# Patient Record
Sex: MALE | Race: WHITE | Employment: OTHER | URBAN - METROPOLITAN AREA
[De-identification: names, ages, dates, MRNs, and addresses within clinical notes are randomized per-mention and may not be internally consistent; named-entity substitution may affect disease eponyms.]

---

## 2022-01-15 ENCOUNTER — APPOINTMENT (OUTPATIENT)
Dept: GENERAL RADIOLOGY | Age: 67
End: 2022-01-15
Payer: MEDICARE

## 2022-01-15 ENCOUNTER — HOSPITAL ENCOUNTER (EMERGENCY)
Age: 67
Discharge: ANOTHER HOSPITAL | End: 2022-01-15
Attending: EMERGENCY MEDICINE
Payer: MEDICARE

## 2022-01-15 ENCOUNTER — APPOINTMENT (OUTPATIENT)
Dept: CT IMAGING | Age: 67
End: 2022-01-15
Payer: MEDICARE

## 2022-01-15 ENCOUNTER — HOSPITAL ENCOUNTER (INPATIENT)
Age: 67
LOS: 4 days | Discharge: HOME | DRG: 177 | End: 2022-01-19
Attending: FAMILY MEDICINE | Admitting: INTERNAL MEDICINE
Payer: MEDICARE

## 2022-01-15 VITALS
WEIGHT: 210 LBS | BODY MASS INDEX: 26.11 KG/M2 | HEIGHT: 75 IN | HEART RATE: 79 BPM | DIASTOLIC BLOOD PRESSURE: 90 MMHG | RESPIRATION RATE: 18 BRPM | OXYGEN SATURATION: 92 % | SYSTOLIC BLOOD PRESSURE: 138 MMHG | TEMPERATURE: 97.3 F

## 2022-01-15 DIAGNOSIS — I26.99 OTHER ACUTE PULMONARY EMBOLISM WITHOUT ACUTE COR PULMONALE (HCC): ICD-10-CM

## 2022-01-15 DIAGNOSIS — U07.1 COVID-19: Primary | ICD-10-CM

## 2022-01-15 PROBLEM — E78.5 HLD (HYPERLIPIDEMIA): Status: ACTIVE | Noted: 2022-01-15

## 2022-01-15 LAB
ALBUMIN SERPL-MCNC: 3.8 G/DL (ref 3.5–4.6)
ALP BLD-CCNC: 90 U/L (ref 35–104)
ALT SERPL-CCNC: 20 U/L (ref 0–41)
AMORPHOUS: NORMAL
ANION GAP SERPL CALCULATED.3IONS-SCNC: 15 MEQ/L (ref 9–15)
ANTI-XA UNFRAC HEPARIN: <0.1 IU/ML
APTT: 29.5 SEC (ref 24.4–36.8)
APTT: 43.4 SEC (ref 24.4–36.8)
AST SERPL-CCNC: 52 U/L (ref 0–40)
BASOPHILS ABSOLUTE: 0 K/UL (ref 0–0.1)
BASOPHILS RELATIVE PERCENT: 0 % (ref 0.2–1.2)
BILIRUB SERPL-MCNC: 1.3 MG/DL (ref 0.2–0.7)
BILIRUBIN URINE: ABNORMAL
BLOOD, URINE: ABNORMAL
BUN BLDV-MCNC: 22 MG/DL (ref 8–23)
C-REACTIVE PROTEIN: 60.3 MG/L (ref 0–5)
CALCIUM SERPL-MCNC: 8.8 MG/DL (ref 8.5–9.9)
CHLORIDE BLD-SCNC: 95 MEQ/L (ref 95–107)
CLARITY: CLEAR
CO2: 21 MEQ/L (ref 20–31)
COLOR: ABNORMAL
CREAT SERPL-MCNC: 0.97 MG/DL (ref 0.7–1.2)
D DIMER: 1.03 MG/L FEU (ref 0–0.5)
D DIMER: 1.84 MG/L FEU (ref 0–0.5)
EKG ATRIAL RATE: 89 BPM
EKG P AXIS: 46 DEGREES
EKG P-R INTERVAL: 130 MS
EKG Q-T INTERVAL: 366 MS
EKG QRS DURATION: 86 MS
EKG QTC CALCULATION (BAZETT): 445 MS
EKG R AXIS: 40 DEGREES
EKG T AXIS: 39 DEGREES
EKG VENTRICULAR RATE: 89 BPM
EOSINOPHILS ABSOLUTE: 0 K/UL (ref 0–0.5)
EOSINOPHILS RELATIVE PERCENT: 0.3 % (ref 0.8–7)
EPITHELIAL CELLS, UA: NORMAL /HPF
FINE CASTS, UA: NORMAL /LPF (ref 0–5)
GFR AFRICAN AMERICAN: >60
GFR NON-AFRICAN AMERICAN: >60
GLOBULIN: 3 G/DL (ref 2.3–3.5)
GLUCOSE BLD-MCNC: 122 MG/DL (ref 70–99)
GLUCOSE URINE: NEGATIVE MG/DL
HCT VFR BLD CALC: 39.6 % (ref 42–52)
HCT VFR BLD CALC: 42.2 % (ref 42–52)
HEMOGLOBIN: 14 G/DL (ref 14–18)
HEMOGLOBIN: 15.2 G/DL (ref 13.7–17.5)
HYALINE CASTS: NORMAL /LPF (ref 0–5)
IMMATURE GRANULOCYTES #: 0 K/UL
IMMATURE GRANULOCYTES %: 0.9 %
INR BLD: 1.1
INR BLD: 1.1
KETONES, URINE: 15 MG/DL
LACTATE DEHYDROGENASE: 722 U/L (ref 135–225)
LEUKOCYTE ESTERASE, URINE: NEGATIVE
LYMPHOCYTES ABSOLUTE: 0.5 K/UL (ref 1.3–3.6)
LYMPHOCYTES RELATIVE PERCENT: 13.6 %
MCH RBC QN AUTO: 29.2 PG (ref 27–31.3)
MCH RBC QN AUTO: 29.3 PG (ref 25.7–32.2)
MCHC RBC AUTO-ENTMCNC: 35.4 % (ref 33–37)
MCHC RBC AUTO-ENTMCNC: 36 % (ref 32.3–36.5)
MCV RBC AUTO: 81.5 FL (ref 79–92.2)
MCV RBC AUTO: 82.6 FL (ref 80–100)
MONOCYTES ABSOLUTE: 0.1 K/UL (ref 0.3–0.8)
MONOCYTES RELATIVE PERCENT: 4.2 % (ref 5.3–12.2)
MUCUS: PRESENT /LPF
NEUTROPHILS ABSOLUTE: 2.7 K/UL (ref 1.8–5.4)
NEUTROPHILS RELATIVE PERCENT: 81 % (ref 34–67.9)
NITRITE, URINE: NEGATIVE
PDW BLD-RTO: 13.2 % (ref 11.6–14.4)
PDW BLD-RTO: 13.9 % (ref 11.5–14.5)
PH UA: 5.5 (ref 5–9)
PLATELET # BLD: 113 K/UL (ref 163–337)
PLATELET # BLD: 120 K/UL (ref 130–400)
PLATELET SLIDE REVIEW: ABNORMAL
POTASSIUM REFLEX MAGNESIUM: 3.7 MEQ/L (ref 3.4–4.9)
PRO-BNP: 22 PG/ML
PROCALCITONIN: 0.08 NG/ML (ref 0–0.15)
PROTEIN UA: 100 MG/DL
PROTHROMBIN TIME: 13.7 SEC (ref 12.3–14.9)
PROTHROMBIN TIME: 14 SEC (ref 12.3–14.9)
RBC # BLD: 4.79 M/UL (ref 4.7–6.1)
RBC # BLD: 5.18 M/UL (ref 4.63–6.08)
RBC UA: NORMAL /HPF (ref 0–2)
SARS-COV-2, NAAT: DETECTED
SLIDE REVIEW: ABNORMAL
SODIUM BLD-SCNC: 131 MEQ/L (ref 135–144)
SPECIFIC GRAVITY UA: 1.01 (ref 1–1.03)
TOTAL PROTEIN: 6.8 G/DL (ref 6.3–8)
TROPONIN: <0.01 NG/ML (ref 0–0.01)
UROBILINOGEN, URINE: 4 E.U./DL
WBC # BLD: 2.1 K/UL (ref 4.8–10.8)
WBC # BLD: 3.3 K/UL (ref 4.2–9)
WBC UA: NORMAL /HPF (ref 0–5)

## 2022-01-15 PROCEDURE — 85025 COMPLETE CBC W/AUTO DIFF WBC: CPT

## 2022-01-15 PROCEDURE — 2580000003 HC RX 258: Performed by: EMERGENCY MEDICINE

## 2022-01-15 PROCEDURE — 84484 ASSAY OF TROPONIN QUANT: CPT

## 2022-01-15 PROCEDURE — 6360000002 HC RX W HCPCS: Performed by: EMERGENCY MEDICINE

## 2022-01-15 PROCEDURE — 83880 ASSAY OF NATRIURETIC PEPTIDE: CPT

## 2022-01-15 PROCEDURE — 81001 URINALYSIS AUTO W/SCOPE: CPT

## 2022-01-15 PROCEDURE — 96366 THER/PROPH/DIAG IV INF ADDON: CPT

## 2022-01-15 PROCEDURE — 71045 X-RAY EXAM CHEST 1 VIEW: CPT

## 2022-01-15 PROCEDURE — 87635 SARS-COV-2 COVID-19 AMP PRB: CPT

## 2022-01-15 PROCEDURE — 87040 BLOOD CULTURE FOR BACTERIA: CPT

## 2022-01-15 PROCEDURE — 6360000002 HC RX W HCPCS: Performed by: NURSE PRACTITIONER

## 2022-01-15 PROCEDURE — 36415 COLL VENOUS BLD VENIPUNCTURE: CPT

## 2022-01-15 PROCEDURE — 1210000000 HC MED SURG R&B

## 2022-01-15 PROCEDURE — 82728 ASSAY OF FERRITIN: CPT

## 2022-01-15 PROCEDURE — 80053 COMPREHEN METABOLIC PANEL: CPT

## 2022-01-15 PROCEDURE — 83615 LACTATE (LD) (LDH) ENZYME: CPT

## 2022-01-15 PROCEDURE — 96375 TX/PRO/DX INJ NEW DRUG ADDON: CPT

## 2022-01-15 PROCEDURE — 93005 ELECTROCARDIOGRAM TRACING: CPT

## 2022-01-15 PROCEDURE — 99285 EMERGENCY DEPT VISIT HI MDM: CPT

## 2022-01-15 PROCEDURE — 96376 TX/PRO/DX INJ SAME DRUG ADON: CPT

## 2022-01-15 PROCEDURE — 85610 PROTHROMBIN TIME: CPT

## 2022-01-15 PROCEDURE — 71275 CT ANGIOGRAPHY CHEST: CPT

## 2022-01-15 PROCEDURE — 96374 THER/PROPH/DIAG INJ IV PUSH: CPT

## 2022-01-15 PROCEDURE — 84145 PROCALCITONIN (PCT): CPT

## 2022-01-15 PROCEDURE — 96365 THER/PROPH/DIAG IV INF INIT: CPT

## 2022-01-15 PROCEDURE — 85730 THROMBOPLASTIN TIME PARTIAL: CPT

## 2022-01-15 PROCEDURE — 85379 FIBRIN DEGRADATION QUANT: CPT

## 2022-01-15 PROCEDURE — 85520 HEPARIN ASSAY: CPT

## 2022-01-15 PROCEDURE — 6370000000 HC RX 637 (ALT 250 FOR IP): Performed by: NURSE PRACTITIONER

## 2022-01-15 PROCEDURE — 86140 C-REACTIVE PROTEIN: CPT

## 2022-01-15 PROCEDURE — 6360000004 HC RX CONTRAST MEDICATION: Performed by: EMERGENCY MEDICINE

## 2022-01-15 PROCEDURE — 85027 COMPLETE CBC AUTOMATED: CPT

## 2022-01-15 RX ORDER — ONDANSETRON 2 MG/ML
4 INJECTION INTRAMUSCULAR; INTRAVENOUS EVERY 6 HOURS PRN
Status: DISCONTINUED | OUTPATIENT
Start: 2022-01-15 | End: 2022-01-19 | Stop reason: HOSPADM

## 2022-01-15 RX ORDER — HEPARIN SODIUM 1000 [USP'U]/ML
80 INJECTION, SOLUTION INTRAVENOUS; SUBCUTANEOUS PRN
Status: DISCONTINUED | OUTPATIENT
Start: 2022-01-15 | End: 2022-01-15 | Stop reason: HOSPADM

## 2022-01-15 RX ORDER — VITAMIN B COMPLEX
2000 TABLET ORAL DAILY
Status: DISCONTINUED | OUTPATIENT
Start: 2022-01-22 | End: 2022-01-19 | Stop reason: HOSPADM

## 2022-01-15 RX ORDER — POLYETHYLENE GLYCOL 3350 17 G/17G
17 POWDER, FOR SOLUTION ORAL DAILY PRN
Status: DISCONTINUED | OUTPATIENT
Start: 2022-01-15 | End: 2022-01-19 | Stop reason: HOSPADM

## 2022-01-15 RX ORDER — SODIUM CHLORIDE 0.9 % (FLUSH) 0.9 %
5-40 SYRINGE (ML) INJECTION PRN
Status: DISCONTINUED | OUTPATIENT
Start: 2022-01-15 | End: 2022-01-19 | Stop reason: HOSPADM

## 2022-01-15 RX ORDER — ACETAMINOPHEN 325 MG/1
650 TABLET ORAL EVERY 6 HOURS PRN
Status: DISCONTINUED | OUTPATIENT
Start: 2022-01-15 | End: 2022-01-15 | Stop reason: SDUPTHER

## 2022-01-15 RX ORDER — ACETAMINOPHEN 325 MG/1
650 TABLET ORAL EVERY 6 HOURS PRN
Status: DISCONTINUED | OUTPATIENT
Start: 2022-01-15 | End: 2022-01-19 | Stop reason: HOSPADM

## 2022-01-15 RX ORDER — ACETAMINOPHEN 650 MG/1
650 SUPPOSITORY RECTAL EVERY 6 HOURS PRN
Status: DISCONTINUED | OUTPATIENT
Start: 2022-01-15 | End: 2022-01-15 | Stop reason: SDUPTHER

## 2022-01-15 RX ORDER — HEPARIN SODIUM 1000 [USP'U]/ML
40 INJECTION, SOLUTION INTRAVENOUS; SUBCUTANEOUS PRN
Status: DISCONTINUED | OUTPATIENT
Start: 2022-01-15 | End: 2022-01-19 | Stop reason: HOSPADM

## 2022-01-15 RX ORDER — SODIUM CHLORIDE 9 MG/ML
25 INJECTION, SOLUTION INTRAVENOUS PRN
Status: DISCONTINUED | OUTPATIENT
Start: 2022-01-15 | End: 2022-01-19 | Stop reason: HOSPADM

## 2022-01-15 RX ORDER — ACETAMINOPHEN 650 MG/1
650 SUPPOSITORY RECTAL EVERY 6 HOURS PRN
Status: DISCONTINUED | OUTPATIENT
Start: 2022-01-15 | End: 2022-01-19 | Stop reason: HOSPADM

## 2022-01-15 RX ORDER — HEPARIN SODIUM 10000 [USP'U]/100ML
5-30 INJECTION, SOLUTION INTRAVENOUS CONTINUOUS
Status: DISCONTINUED | OUTPATIENT
Start: 2022-01-15 | End: 2022-01-15 | Stop reason: HOSPADM

## 2022-01-15 RX ORDER — HEPARIN SODIUM 1000 [USP'U]/ML
80 INJECTION, SOLUTION INTRAVENOUS; SUBCUTANEOUS PRN
Status: DISCONTINUED | OUTPATIENT
Start: 2022-01-15 | End: 2022-01-19 | Stop reason: HOSPADM

## 2022-01-15 RX ORDER — 0.9 % SODIUM CHLORIDE 0.9 %
1000 INTRAVENOUS SOLUTION INTRAVENOUS ONCE
Status: COMPLETED | OUTPATIENT
Start: 2022-01-15 | End: 2022-01-15

## 2022-01-15 RX ORDER — HEPARIN SODIUM 1000 [USP'U]/ML
80 INJECTION, SOLUTION INTRAVENOUS; SUBCUTANEOUS ONCE
Status: COMPLETED | OUTPATIENT
Start: 2022-01-15 | End: 2022-01-15

## 2022-01-15 RX ORDER — ONDANSETRON 4 MG/1
4 TABLET, ORALLY DISINTEGRATING ORAL EVERY 8 HOURS PRN
Status: DISCONTINUED | OUTPATIENT
Start: 2022-01-15 | End: 2022-01-19 | Stop reason: HOSPADM

## 2022-01-15 RX ORDER — HEPARIN SODIUM 1000 [USP'U]/ML
40 INJECTION, SOLUTION INTRAVENOUS; SUBCUTANEOUS PRN
Status: DISCONTINUED | OUTPATIENT
Start: 2022-01-15 | End: 2022-01-15 | Stop reason: HOSPADM

## 2022-01-15 RX ORDER — SODIUM CHLORIDE 0.9 % (FLUSH) 0.9 %
5-40 SYRINGE (ML) INJECTION EVERY 12 HOURS SCHEDULED
Status: DISCONTINUED | OUTPATIENT
Start: 2022-01-15 | End: 2022-01-19 | Stop reason: HOSPADM

## 2022-01-15 RX ORDER — DEXAMETHASONE 6 MG/1
6 TABLET ORAL DAILY
Status: DISCONTINUED | OUTPATIENT
Start: 2022-01-16 | End: 2022-01-19 | Stop reason: HOSPADM

## 2022-01-15 RX ORDER — GUAIFENESIN/DEXTROMETHORPHAN 100-10MG/5
5 SYRUP ORAL EVERY 4 HOURS PRN
Status: DISCONTINUED | OUTPATIENT
Start: 2022-01-15 | End: 2022-01-19 | Stop reason: HOSPADM

## 2022-01-15 RX ORDER — DEXAMETHASONE SODIUM PHOSPHATE 10 MG/ML
6 INJECTION, SOLUTION INTRAMUSCULAR; INTRAVENOUS ONCE
Status: COMPLETED | OUTPATIENT
Start: 2022-01-15 | End: 2022-01-15

## 2022-01-15 RX ORDER — VITAMIN B COMPLEX
6000 TABLET ORAL DAILY
Status: DISCONTINUED | OUTPATIENT
Start: 2022-01-15 | End: 2022-01-19 | Stop reason: HOSPADM

## 2022-01-15 RX ADMIN — HEPARIN SODIUM 7620 UNITS: 1000 INJECTION INTRAVENOUS; SUBCUTANEOUS at 22:07

## 2022-01-15 RX ADMIN — DEXAMETHASONE SODIUM PHOSPHATE 6 MG: 10 INJECTION INTRAMUSCULAR; INTRAVENOUS at 11:13

## 2022-01-15 RX ADMIN — Medication 6000 UNITS: at 22:17

## 2022-01-15 RX ADMIN — HEPARIN SODIUM AND DEXTROSE 5 UNITS/KG/HR: 10000; 5 INJECTION INTRAVENOUS at 13:44

## 2022-01-15 RX ADMIN — HEPARIN SODIUM 7620 UNITS: 1000 INJECTION INTRAVENOUS; SUBCUTANEOUS at 13:45

## 2022-01-15 RX ADMIN — SODIUM CHLORIDE 1000 ML: 9 INJECTION, SOLUTION INTRAVENOUS at 11:13

## 2022-01-15 RX ADMIN — IOPAMIDOL 100 ML: 755 INJECTION, SOLUTION INTRAVENOUS at 11:25

## 2022-01-15 ASSESSMENT — ENCOUNTER SYMPTOMS
RHINORRHEA: 0
WHEEZING: 0
BACK PAIN: 0
COUGH: 1
PHOTOPHOBIA: 0
DIARRHEA: 0
NAUSEA: 0
SHORTNESS OF BREATH: 1
SORE THROAT: 0
VOMITING: 0
ABDOMINAL PAIN: 0

## 2022-01-15 NOTE — ED NOTES
[facility] called back with Dr. Paige Cutler accepted patient for admission/transfer     Nirmal Manzano RN  01/15/22 [id number]

## 2022-01-15 NOTE — ED NOTES
Called transfer center for update on bed assignment. Patient has been assigned a bed but is currently being cleaned. States it will be about 40 minutes until bed will be ready. Will call us back with bed assignment and number for report.       Jodi Young  01/15/22 615 Mark Damon  01/15/22 6402

## 2022-01-15 NOTE — ED NOTES
Called transfer center to tristan hospitalist at [facility] for Dr to  consult.       Shelly Wilcox  01/15/22 3942

## 2022-01-15 NOTE — ED NOTES
Heparin gtt continued in route via EMS by IV pump to [facility], report included informing nurse at [facility] that pt/INR be drawn prior to 2000.      Caren White RN  01/15/22 3784

## 2022-01-15 NOTE — ED NOTES
Call from transfer center with bed assignment. Patient assigned bed 468-1. Number for report is [phone].       Chase Fitzgerald  01/15/22 9316

## 2022-01-15 NOTE — ED NOTES
Report given to Richa [place] at bedside patient transported to [facility] via [address] with belongings.      Terrell Menezes RN  01/15/22 7429

## 2022-01-15 NOTE — ED TRIAGE NOTES
Patient presents to ED with c/o sob for the past 2 weeks and no improvement - also reports mild nausea

## 2022-01-15 NOTE — ED NOTES
Patient aware of POC to transfer to ED [facility]. Patient placed on 2L NC r/t pulse ox dropping to 88% on Room Air while sleeping.      Homa Padron RN  01/15/22 0903

## 2022-01-15 NOTE — ED NOTES
Report called to [facility] at [facility]. Patient to go to [phone].      Lukas Garcia RN  01/15/22 2674

## 2022-01-15 NOTE — ED PROVIDER NOTES
eMERGENCY dEPARTMENT eNCOUnter      [address] [facility]    Chief Complaint   Patient presents with    Shortness of Breath     x2 weeks, worsened over the last two days       HPI    Ebony Hoyt is a 78 y.o. male with history of kidney stones who presentsto ED from home  By private car  With complaint of shortness of breath, fever  Onset 2 days  Intensity of symptoms moderate  Patient has cough with no expectoration. Patient is not COVID vaccinated. He is not a smoker. Patient has been fever and chills. He denies chest pain but complains of shortness of breath. PAST MEDICAL HISTORY    Past Medical History:   Diagnosis Date    Kidney stone        SURGICAL HISTORY    History reviewed. No pertinent surgical history. CURRENT MEDICATIONS        ALLERGIES    No Known Allergies    FAMILY HISTORY    History reviewed. No pertinent family history. SOCIAL HISTORY    Social History     Socioeconomic History    Marital status:      Spouse name: None    Number of children: None    Years of education: None    Highest education level: None   Occupational History    None   Tobacco Use    Smoking status: Never Smoker    Smokeless tobacco: Never Used   Substance and Sexual Activity    Alcohol use: Never    Drug use: Never    Sexual activity: Not Currently   Other Topics Concern    None   Social History Narrative    None     Social Determinants of Health     Financial Resource Strain:     Difficulty of Paying Living Expenses: Not on file   Food Insecurity:     Worried About Running Out of Food in the Last Year: Not on file    Allyssa of Food in the Last Year: Not on file   Transportation Needs:     Lack of Transportation (Medical): Not on file    Lack of Transportation (Non-Medical):  Not on file   Physical Activity:     Days of Exercise per Week: Not on file    Minutes of Exercise per Session: Not on file   Stress:     Feeling of Stress : Not on file   Social Connections:     Frequency of Communication with Friends and Family: Not on file    Frequency of Social Gatherings with Friends and Family: Not on file    Attends Taoism Services: Not on file    Active Member of Clubs or Organizations: Not on file    Attends Club or Organization Meetings: Not on file    Marital Status: Not on file   Intimate Partner Violence:     Fear of Current or Ex-Partner: Not on file    Emotionally Abused: Not on file    Physically Abused: Not on file    Sexually Abused: Not on file   Housing Stability:     Unable to Pay for Housing in the Last Year: Not on file    Number of [place] in the Last Year: Not on file    Unstable Housing in the Last Year: Not on file       REVIEW OF SYSTEMS    Constitutional: Complains of fever, chills, and generalized weakness   Eyes:  Denies photophobia or discharge   HENT:  Denies sore throat or ear pain   Respiratory: Complains of cough and shortness of breath   Cardiovascular:  Denies chest pain, palpitations or swelling   GI:  Denies abdominal pain, nausea, vomiting, or diarrhea   Musculoskeletal:  Denies back pain   Skin:  Denies rash   Neurologic:  Denies headache, focal weakness or sensory changes   Endocrine:  Denies polyuria or polydypsia   Lymphatic:  Denies swollen glands   Psychiatric:  Denies depression, suicidal ideation or homicidal ideation   All systems negative except as marked. PHYSICAL EXAM    VITAL SIGNS: /77   Pulse 80   Temp 97.3 °F (36.3 °C) (Skin)   Resp 20   Ht 6' 3\" (1.905 m)   Wt 210 lb (95.3 kg)   SpO2 93%   BMI 26.25 kg/m²    Constitutional:  Well developed, Well nourished, mild acute distress, Non-toxic appearance. HENT:  Normocephalic, Atraumatic, Bilateral external ears normal, Oropharynx moist, No oral exudates, Nose normal. Neck- Normal range of motion, No tenderness, Supple, No stridor. Eyes:  PERRL, EOMI, Conjunctiva normal, No discharge.    Respiratory:  coarse breath sounds, mild respiratory distress, No wheezing, No chest tenderness. Cardiovascular:  Normal heart rate, Normal rhythm, No murmurs, No rubs, No gallops. GI:  Bowel sounds normal, Soft, No tenderness, No masses, No pulsatile masses. : No CVA tenderness. Musculoskeletal:  Intact distal pulses, No edema, No tenderness, No cyanosis, No clubbing. Good range of motion in all major joints. No tenderness to palpation or major deformities noted. Back- No tenderness. Integument:  Warm, Dry, No erythema, No rash. Lymphatic:  No lymphadenopathy noted. Neurologic:  Alert & oriented x 3, Normal motor function, Normal sensory function, No focal deficits noted. Psychiatric:  Affect normal, Judgment normal, Mood normal.     EKG    NSR, HR 89 , Normal Axis, No ST- T wave changes, QTc 445    RADIOLOGY    CTA CHEST W WO CONTRAST - r/o Pulmonary Embolism   Final Result      Acute pulmonary embolism, junction left interlobar and left segmental artery, left lower lobe. Bilateral groundglass pulmonary opacities as discussed. This finding may be seen in patients with covid 19 pneumonia. However, other infectious and inflammatory etiologies may result in a similar radiographic appearance. Bilateral lower lung subsegmental atelectasis/pneumonia      Emphysema. Bilateral hilar lymph node enlargement as described. All CT scans at this facility use dose modulation, iterative reconstruction, and/or weight based dosing when appropriate to reduce radiation dose to as low as reasonably achievable. XR CHEST PORTABLE   Final Result   LEFT LOWER LUNG ATELECTASIS/PNEUMONIA. REEVALUATION   Patient was updated the results of labs and Radiology. Spoke with hospitalist Dr Castro Gaxiola accepted admission     I have personally provided  30  minutes of critical care time exclusive of time spent on separately billable procedures.  Time includes review of laboratory data, radiology results, discussion with consultants, and monitoring for potential decompensation. Interventions were performed as documented above.     Labs  Labs Reviewed   COVID-19, RAPID - Abnormal; Notable for the following components:       Result Value    SARS-CoV-2, NAAT DETECTED (*)     All other components within normal limits    Narrative:     Donta CUETO tel. [phone],  not needed per protocol, 01/15/2022 10:54, by HEATHER   CBC WITH AUTO DIFFERENTIAL - Abnormal; Notable for the following components:    WBC 3.3 (*)     Platelets 882 (*)     Neutrophils % 81.0 (*)     Monocytes % 4.2 (*)     Eosinophils % 0.3 (*)     Basophils % 0.0 (*)     Lymphocytes Absolute 0.5 (*)     Monocytes Absolute 0.1 (*)     All other components within normal limits   COMPREHENSIVE METABOLIC PANEL W/ REFLEX TO MG FOR LOW K - Abnormal; Notable for the following components:    Sodium 131 (*)     Glucose 122 (*)     Total Bilirubin 1.3 (*)     AST 52 (*)     All other components within normal limits   D-DIMER, QUANTITATIVE - Abnormal; Notable for the following components:    D-Dimer, Quant 1.84 (*)     All other components within normal limits    Narrative:     CALL  Jaylan CUETO tel. [phone],  Coag results called to and read back by Dr Hunter Martinez, 01/15/2022 11:08, by  HEATHER   CULTURE, BLOOD 1   CULTURE, BLOOD 2   TROPONIN   BRAIN NATRIURETIC PEPTIDE   PROTIME-INR   APTT   URINALYSIS             Summation      Patient Course:     ED Medications administered this visit:    Medications   heparin (porcine) injection 7,620 Units (has no administration in time range)   heparin 25,000 units in dextrose 5% 250 mL (premix) infusion (has no administration in time range)   heparin (porcine) injection 7,620 Units (has no administration in time range)   heparin (porcine) injection 3,810 Units (has no administration in time range)   dexamethasone (PF) (DECADRON) injection 6 mg (6 mg IntraVENous Given 1/15/22 1113)   0.9 % sodium chloride bolus (1,000 mLs IntraVENous New Bag 1/15/22 1113)   iopamidol (ISOVUE-370) 76 % injection 100 mL (100 mLs IntraVENous Given 1/15/22 1129)       New Prescriptions from this visit:    New Prescriptions    No medications on file       Follow-up:  No follow-up provider specified. Final Impression:   1. COVID-19    2.  Other acute pulmonary embolism without acute cor pulmonale ([address]               (Please note that portions of this note were completed with a voice recognition program.  Efforts were made to edit the dictations but occasionally words are mis-transcribed.)          Carina Rainey MD  01/15/22 [address], MD  01/15/22 [address], MD  01/15/22 1148

## 2022-01-15 NOTE — ED NOTES
Patient aware of POC to transfer to [facility]>     Fidencio Craft RN  01/15/22 8903

## 2022-01-16 ENCOUNTER — APPOINTMENT (OUTPATIENT)
Dept: ULTRASOUND IMAGING | Age: 67
DRG: 177 | End: 2022-01-16
Attending: FAMILY MEDICINE
Payer: MEDICARE

## 2022-01-16 PROBLEM — I26.99 PE (PULMONARY THROMBOEMBOLISM) (HCC): Status: ACTIVE | Noted: 2022-01-16

## 2022-01-16 LAB
ANTI-XA UNFRAC HEPARIN: 1.41 IU/ML
BASOPHILS ABSOLUTE: 0 K/UL (ref 0–0.2)
BASOPHILS RELATIVE PERCENT: 0.1 %
EOSINOPHILS ABSOLUTE: 0 K/UL (ref 0–0.7)
EOSINOPHILS RELATIVE PERCENT: 0 %
HCT VFR BLD CALC: 37.4 % (ref 42–52)
HEMOGLOBIN: 13.3 G/DL (ref 14–18)
LYMPHOCYTES ABSOLUTE: 0.5 K/UL (ref 1–4.8)
LYMPHOCYTES RELATIVE PERCENT: 12.5 %
MCH RBC QN AUTO: 29.4 PG (ref 27–31.3)
MCHC RBC AUTO-ENTMCNC: 35.4 % (ref 33–37)
MCV RBC AUTO: 83.1 FL (ref 80–100)
MONOCYTES ABSOLUTE: 0.3 K/UL (ref 0.2–0.8)
MONOCYTES RELATIVE PERCENT: 6.5 %
NEUTROPHILS ABSOLUTE: 3.4 K/UL (ref 1.4–6.5)
NEUTROPHILS RELATIVE PERCENT: 80.9 %
PDW BLD-RTO: 13.9 % (ref 11.5–14.5)
PLATELET # BLD: 136 K/UL (ref 130–400)
RBC # BLD: 4.51 M/UL (ref 4.7–6.1)
TROPONIN: <0.01 NG/ML (ref 0–0.01)
WBC # BLD: 4.2 K/UL (ref 4.8–10.8)

## 2022-01-16 PROCEDURE — 2060000000 HC ICU INTERMEDIATE R&B

## 2022-01-16 PROCEDURE — 6360000002 HC RX W HCPCS: Performed by: INTERNAL MEDICINE

## 2022-01-16 PROCEDURE — 82306 VITAMIN D 25 HYDROXY: CPT

## 2022-01-16 PROCEDURE — 36415 COLL VENOUS BLD VENIPUNCTURE: CPT

## 2022-01-16 PROCEDURE — 2700000000 HC OXYGEN THERAPY PER DAY

## 2022-01-16 PROCEDURE — 2580000003 HC RX 258: Performed by: NURSE PRACTITIONER

## 2022-01-16 PROCEDURE — 84484 ASSAY OF TROPONIN QUANT: CPT

## 2022-01-16 PROCEDURE — XW033E5 INTRODUCTION OF REMDESIVIR ANTI-INFECTIVE INTO PERIPHERAL VEIN, PERCUTANEOUS APPROACH, NEW TECHNOLOGY GROUP 5: ICD-10-PCS | Performed by: INTERNAL MEDICINE

## 2022-01-16 PROCEDURE — 2500000003 HC RX 250 WO HCPCS: Performed by: INTERNAL MEDICINE

## 2022-01-16 PROCEDURE — 1210000000 HC MED SURG R&B

## 2022-01-16 PROCEDURE — 85025 COMPLETE CBC W/AUTO DIFF WBC: CPT

## 2022-01-16 PROCEDURE — 2580000003 HC RX 258: Performed by: INTERNAL MEDICINE

## 2022-01-16 PROCEDURE — 6370000000 HC RX 637 (ALT 250 FOR IP): Performed by: NURSE PRACTITIONER

## 2022-01-16 PROCEDURE — 99223 1ST HOSP IP/OBS HIGH 75: CPT | Performed by: INTERNAL MEDICINE

## 2022-01-16 PROCEDURE — 93970 EXTREMITY STUDY: CPT

## 2022-01-16 PROCEDURE — 85520 HEPARIN ASSAY: CPT

## 2022-01-16 RX ADMIN — REMDESIVIR 200 MG: 100 INJECTION, POWDER, LYOPHILIZED, FOR SOLUTION INTRAVENOUS at 14:52

## 2022-01-16 RX ADMIN — Medication 10 ML: at 20:45

## 2022-01-16 RX ADMIN — ENOXAPARIN SODIUM 100 MG: 100 INJECTION SUBCUTANEOUS at 20:44

## 2022-01-16 RX ADMIN — DEXAMETHASONE 6 MG: 6 TABLET ORAL at 10:16

## 2022-01-16 RX ADMIN — Medication 6000 UNITS: at 10:15

## 2022-01-16 RX ADMIN — Medication 10 ML: at 10:15

## 2022-01-16 RX ADMIN — Medication 10 ML: at 10:16

## 2022-01-16 RX ADMIN — ENOXAPARIN SODIUM 100 MG: 100 INJECTION SUBCUTANEOUS at 10:15

## 2022-01-16 ASSESSMENT — ENCOUNTER SYMPTOMS
COUGH: 1
NAUSEA: 0
SHORTNESS OF BREATH: 1
DIARRHEA: 0
VOMITING: 0

## 2022-01-16 NOTE — PROGRESS NOTES
Pt assessed and medication given. Pt tolerated well. Pt A&Ox4 with no c/o pain. VSS. Pt on 2L O2 NC. Call light within reach. Will continue to monitor.

## 2022-01-16 NOTE — CONSULTS
Pulmonary and Critical Care Medicine  Consult Note  Encounter Date: 2022 2:04 PM    Mr. Larissa Gao is a 78 y.o. male  : 1955  Requesting Provider: Naveen Sims MD    Reason for request: COVID-19 pneumonia            HISTORY OF PRESENT ILLNESS:    Patient is 78 y.o. presents with 2 weeks history of cough productive of clear phlegm, shortness of breath, progressively worse, chest pain mainly when he coughs, no fever or chills, he did have nausea and upset stomach, no diarrhea, no abdominal pain, no worsening lower extremity edema. Patient does not smoke, he is not vaccinated. Patient desaturated to 88% on room air, currently on 2 L O2 saturation 94%. Past Medical History:        Diagnosis Date    Kidney stone        Past Surgical History:    No past surgical history on file. Social History:     reports that he has never smoked. He has never used smokeless tobacco. He reports that he does not drink alcohol and does not use drugs. Family History:   No family history on file. Allergies:  Patient has no known allergies.         MEDICATIONS during current hospitalization:    Continuous Infusions:   sodium chloride      sodium chloride         Scheduled Meds:   enoxaparin  1 mg/kg SubCUTAneous BID    remdesivir IVPB  200 mg IntraVENous Once    Followed by   Earnstine Laser ON 2022] remdesivir IVPB  100 mg IntraVENous Q24H    sodium chloride flush  5-40 mL IntraVENous 2 times per day    sodium chloride flush  5-40 mL IntraVENous 2 times per day    dexamethasone  6 mg Oral Daily    Vitamin D  6,000 Units Oral Daily    Followed by   Earnstine Laser ON 2022] Vitamin D  2,000 Units Oral Daily       PRN Meds:sodium chloride flush, sodium chloride, ondansetron **OR** ondansetron, polyethylene glycol, acetaminophen **OR** acetaminophen, heparin (porcine), heparin (porcine), sodium chloride flush, sodium chloride, guaiFENesin-dextromethorphan, bisacodyl        REVIEW OF SYSTEMS:  ROS: 10 organs review of system is done including general, psychological, ENT, hematological, endocrine, respiratory, cardiovascular, gastrointestinal, musculoskeletal, neurological,  allergy and Immunology is done and is otherwise negative. PHYSICAL EXAM:    Vitals:  /79   Pulse 75   Temp 97.7 °F (36.5 °C) (Oral)   Resp 18   SpO2 94%     General: alert, cooperative, no distress  Head: normocephalic, atraumatic  Eyes:No gross abnormalities. ENT:  MMM no lesions  Neck:  supple and no masses  Chest : Good air movement, no wheezing, no rales, nontender, tympanic  Heart[de-identified] Heart sounds are normal.  Regular rate and rhythm without murmur, gallop or rub. ABD:  symmetric, soft, non-tender, no guarding or rebound  Musculoskeletal : no cyanosis, no clubbing and no edema  Neuro:  Grossly normal  Skin: No rashes or nodules noted. Lymph node:  no cervical nodes  Urology: No Arias   Psychiatric: appropriate        Data Review  Recent Labs     01/15/22  1041 01/15/22  2032 01/16/22  1104   WBC 3.3* 2.1* 4.2*   HGB 15.2 14.0 13.3*   HCT 42.2 39.6* 37.4*   * 120* 136      Recent Labs     01/15/22  1041   *   K 3.7   CL 95   CO2 21   BUN 22   CREATININE 0.97   GLUCOSE 122*       MV Settings: ABGs: No results for input(s): PHART, NQT0JFC, PO2ART, FCY8CHQ, BEART, E5HCQADZ, BHM0LQO in the last 72 hours.   O2 Device: Nasal cannula  O2 Flow Rate (L/min): 2 L/min  No results found for: [address]    Radiology  I personally reviewed imaging studies and CT chest shows acute PE left lower lobe, bilateral groundglass infiltrate consistent with COVID-19 pneumonia        Assessment, plan:   Patient is at risk due to    · Acute hypoxic respiratory failure  · Secondary to acute PE  · COVID-19 pneumonia, duration of symptoms 2 weeks  · Prominent mediastinal lymph nodes, likely reactive    Recommendation  · Continue Lovenox, therapeutic dose  · Can transition to novel oral anticoagulant tomorrow  · Will DC remdesivir onset of symptoms 2 weeks ago  · Continue dexamethasone 6 mg daily for 10 days  · O2 to keep sat 93 to 95%  · Maintain euvolemic and avoid volume overload   · Follow-up CT chest in 6 to 8 weeks to monitor mediastinal lymph nodes.     Thank you for consultation    Electronically signed by Lina Clifford MD, [facility],  on 1/16/2022 at 2:04 PM

## 2022-01-16 NOTE — PROGRESS NOTES
Admission assessment completed. Patient alert and oriented x 4, VSS, denies pain. Lungs diminished, SPO2 90% on 2L via nasal cannula. Heparin IV infusing at 22 units/kg/hr at 21ml/hr. Skin intact, no skin issues noted. Up to bathroom ad erci with IV pole, slow and steady gait observed.

## 2022-01-16 NOTE — H&P
[place] [address] MEDICINE    HISTORY AND PHYSICAL EXAM    PATIENT NAME:  Verdia Phalen    MRN:  [id number]  SERVICE DATE:  1/15/2022   SERVICE TIME:  9:05 PM    Primary Care Physician: Solmon Cranker, MD         SUBJECTIVE  CHIEF COMPLAINT: SOB    HPI: Patient being admitted for pulmonary embolism. Patient is a very pleasant, alert and oriented x3,  male, 59-year-old. Patient reports that he has been having shortness of breath x2 weeks. However, he noticed in the past week it seemed to worsen significantly and much more so in the past 24 hours and definitely worse with exertion. At the onset of his symptoms patient states that he had nausea and decreased appetite. However, he states that this has since resolved and his appetite has returned to normal.  Patient reports he has had a mild nonproductive cough. Patient denies any chest pain, abdominal pain, vomiting, diarrhea. Patient is not vaccinated. Patient is a former smoker who stopped smoking 10 years ago. Patient rarely drinks alcohol. Patient will smoke marijuana once in a while but no other illicit drugs. Patient's only other medical concern is hyperlipidemia. Patient has no history of blood clots or cardiac disease    PAST MEDICAL HISTORY:    Past Medical History:   Diagnosis Date    Kidney stone      PAST SURGICAL HISTORY:  No past surgical history on file. FAMILY HISTORY:  No family history on file.   SOCIAL HISTORY:    Social History     Socioeconomic History    Marital status:      Spouse name: Not on file    Number of children: Not on file    Years of education: Not on file    Highest education level: Not on file   Occupational History    Not on file   Tobacco Use    Smoking status: Never Smoker    Smokeless tobacco: Never Used   Substance and Sexual Activity    Alcohol use: Never    Drug use: Never    Sexual activity: Not Currently   Other Topics Concern    Not on file   Social History Narrative    Not on file     Social Determinants of Health     Financial Resource Strain:     Difficulty of Paying Living Expenses: Not on file   Food Insecurity:     Worried About Running Out of Food in the Last Year: Not on file    Allyssa of Food in the Last Year: Not on file   Transportation Needs:     Lack of Transportation (Medical): Not on file    Lack of Transportation (Non-Medical): Not on file   Physical Activity:     Days of Exercise per Week: Not on file    Minutes of Exercise per Session: Not on file   Stress:     Feeling of Stress : Not on file   Social Connections:     Frequency of Communication with Friends and Family: Not on file    Frequency of Social Gatherings with Friends and Family: Not on file    Attends Caodaism Services: Not on file    Active Member of [address] [place] [address] [employer] or Organizations: Not on file    Attends Club or Organization Meetings: Not on file    Marital Status: Not on file   Intimate Partner Violence:     Fear of Current or Ex-Partner: Not on file    Emotionally Abused: Not on file    Physically Abused: Not on file    Sexually Abused: Not on file   Housing Stability:     Unable to Pay for Housing in the Last Year: Not on file    Number of [place] in the Last Year: Not on file    Unstable Housing in the Last Year: Not on file     MEDICATIONS:   Prior to Admission medications    Not on File       ALLERGIES: Patient has no known allergies. REVIEW OF SYSTEM:   Review of Systems   Constitutional: Negative for activity change, chills, fatigue and fever. HENT: Negative for congestion, ear pain, rhinorrhea and sore throat. Eyes: Negative for photophobia and visual disturbance. Respiratory: Positive for cough and shortness of breath. Negative for wheezing. Cardiovascular: Negative for chest pain and leg swelling. Gastrointestinal: Negative for abdominal pain, diarrhea, nausea and vomiting. Endocrine: Negative for polydipsia, polyphagia and polyuria.    Genitourinary: Negative for dysuria, flank pain, hematuria and urgency. Musculoskeletal: Negative for back pain, myalgias and neck stiffness. Skin: Negative for rash and wound. Allergic/Immunologic: Negative for immunocompromised state. Neurological: Negative for dizziness and headaches. Psychiatric/Behavioral: Negative for behavioral problems and confusion. OBJECTIVE  PHYSICAL EXAM: There were no vitals taken for this visit. Physical Exam  Vitals and nursing note reviewed. Constitutional:       Appearance: He is well-developed. HENT:      Head: Normocephalic and atraumatic. Nose: Nose normal.   Eyes:      Pupils: Pupils are equal, round, and reactive to light. Cardiovascular:      Rate and Rhythm: Normal rate and regular rhythm. Heart sounds: Normal heart sounds. Pulmonary:      Effort: Pulmonary effort is normal. No tachypnea, accessory muscle usage, prolonged expiration or respiratory distress. Breath sounds: Examination of the right-lower field reveals rales. Examination of the left-lower field reveals rales. Rales present. No wheezing or rhonchi. Abdominal:      General: Bowel sounds are normal.      Palpations: Abdomen is soft. There is no mass. Tenderness: There is no abdominal tenderness. Musculoskeletal:         General: Normal range of motion. Cervical back: Normal range of motion. Lymphadenopathy:      Cervical: No cervical adenopathy. Skin:     General: Skin is warm and dry. Capillary Refill: Capillary refill takes less than 2 seconds. Neurological:      Mental Status: He is alert and oriented to person, place, and time. Deep Tendon Reflexes: Reflexes normal.   Psychiatric:         Thought Content: Thought content normal.            DATA:     Diagnostic tests reviewed for today's visit:    Most recent labs and imaging results reviewed.      LABS:    Recent Results (from the past 24 hour(s))   EKG 12 Lead    Collection Time: 01/15/22 10:37 AM   Result Value Ref Range    Ventricular Rate 89 BPM    Atrial Rate 89 BPM    P-R Interval 130 ms    QRS Duration 86 ms    Q-T Interval 366 ms    QTc Calculation (Bazett) 445 ms    P Axis 46 degrees    R Axis 40 degrees    T Axis 39 degrees   COVID-19, Rapid    Collection Time: 01/15/22 10:39 AM    Specimen: Nasopharyngeal Swab   Result Value Ref Range    SARS-CoV-2, NAAT DETECTED (A) Not Detected   CBC Auto Differential    Collection Time: 01/15/22 10:41 AM   Result Value Ref Range    WBC 3.3 (L) 4.2 - 9.0 K/uL    RBC 5.18 4.63 - 6.08 M/uL    Hemoglobin 15.2 13.7 - 17.5 g/dL    Hematocrit 42.2 42.0 - 52.0 %    MCV 81.5 79.0 - 92.2 fL    MCH 29.3 25.7 - 32.2 pg    MCHC 36.0 32.3 - 36.5 %    RDW 13.2 11.6 - 14.4 %    Platelets 355 (L) 723 - 337 K/uL    PLATELET SLIDE REVIEW Decreased     SLIDE REVIEW see below     Neutrophils % 81.0 (H) 34.0 - 67.9 %    Immature Granulocytes % 0.9 %    Lymphocytes % 13.6 %    Monocytes % 4.2 (L) 5.3 - 12.2 %    Eosinophils % 0.3 (L) 0.8 - 7.0 %    Basophils % 0.0 (L) 0.2 - 1.2 %    Neutrophils Absolute 2.7 1.8 - 5.4 K/uL    Immature Granulocytes # 0.0 K/uL    Lymphocytes Absolute 0.5 (L) 1.3 - 3.6 K/uL    Monocytes Absolute 0.1 (L) 0.3 - 0.8 K/uL    Eosinophils Absolute 0.0 0.0 - 0.5 K/uL    Basophils Absolute 0.0 0.0 - 0.1 K/uL   Comprehensive Metabolic Panel w/ Reflex to MG    Collection Time: 01/15/22 10:41 AM   Result Value Ref Range    Sodium 131 (L) 135 - 144 mEq/L    Potassium reflex Magnesium 3.7 3.4 - 4.9 mEq/L    Chloride 95 95 - 107 mEq/L    CO2 21 20 - 31 mEq/L    Anion Gap 15 9 - 15 mEq/L    Glucose 122 (H) 70 - 99 mg/dL    BUN 22 8 - 23 mg/dL    CREATININE 0.97 0.70 - 1.20 mg/dL    GFR Non-African American >60.0 >60    GFR  >60.0 >60    Calcium 8.8 8.5 - 9.9 mg/dL    Total Protein 6.8 6.3 - 8.0 g/dL    Albumin 3.8 3.5 - 4.6 g/dL    Total Bilirubin 1.3 (H) 0.2 - 0.7 mg/dL    Alkaline Phosphatase 90 35 - 104 U/L    ALT 20 0 - 41 U/L    AST 52 (H) 0 - 40 U/L    Globulin 3.0 2.3 - 3.5 g/dL   Troponin    Collection Time: 01/15/22 10:41 AM   Result Value Ref Range    Troponin <0.010 0.000 - 0.010 ng/mL   Brain Natriuretic Peptide    Collection Time: 01/15/22 10:41 AM   Result Value Ref Range    Pro-BNP 22 pg/mL   Protime-INR    Collection Time: 01/15/22 10:41 AM   Result Value Ref Range    Protime 13.7 12.3 - 14.9 sec    INR 1.1    APTT    Collection Time: 01/15/22 10:41 AM   Result Value Ref Range    aPTT 29.5 24.4 - 36.8 sec   D-Dimer, Quantitative    Collection Time: 01/15/22 10:41 AM   Result Value Ref Range    D-Dimer, Quant 1.84 (HH) 0.00 - 0.50 mg/L FEU   Urinalysis, reflex to microscopic    Collection Time: 01/15/22  3:24 PM   Result Value Ref Range    Color, UA Dark yellow Straw/Yellow    Clarity, UA Clear Clear    Glucose, Ur Negative Negative mg/dL    Bilirubin Urine Small Negative    Ketones, Urine 15 (A) Negative mg/dL    Specific Gravity, UA 1.015 1.005 - 1.030    Blood, Urine Trace-intact Negative    pH, UA 5.5 5.0 - 9.0    Protein,  (A) Negative mg/dL    Urobilinogen, Urine 4.0 (A) <2.0 E.U./dL    Nitrite, Urine Negative Negative    Leukocyte Esterase, Urine Negative Negative   Microscopic Urinalysis    Collection Time: 01/15/22  3:24 PM   Result Value Ref Range    Hyaline Casts, UA 0-1 0 - 5 /LPF    Fine Casts, UA 0-1 0 - 5 /LPF    Mucus, UA Present None Seen /LPF    WBC, UA 0-2 0 - 5 /HPF    RBC, UA 0-2 0 - 2 /HPF    Epithelial Cells, UA 0-2 /HPF    Amorphous, UA 1+    CBC    Collection Time: 01/15/22  8:32 PM   Result Value Ref Range    WBC 2.1 (L) 4.8 - 10.8 K/uL    RBC 4.79 4.70 - 6.10 M/uL    Hemoglobin 14.0 14.0 - 18.0 g/dL    Hematocrit 39.6 (L) 42.0 - 52.0 %    MCV 82.6 80.0 - 100.0 fL    MCH 29.2 27.0 - 31.3 pg    MCHC 35.4 33.0 - 37.0 %    RDW 13.9 11.5 - 14.5 %    Platelets 960 (L) 252 - 400 K/uL   APTT    Collection Time: 01/15/22  8:32 PM   Result Value Ref Range    aPTT 43.4 (H) 24.4 - 36.8 sec   Protime-INR    Collection Time: 01/15/22  8:32 PM Result Value Ref Range    Protime 14.0 12.3 - 14.9 sec    INR 1.1        IMAGING:  CTA CHEST W WO CONTRAST - r/o Pulmonary Embolism    Result Date: 1/15/2022  CT PULMONARY ANGIOGRAM WITH INTRAVENOUS CONTRAST MEDIUM. REASON FOR EXAMINATION:  SHORTNESS OF BREATH, WORSENED OVER LAST 2 DAYS. TECHNIQUE: Helical CTA was performed through the chest utilizing 100 ml of Isovue-370 intravenous contrast.  Images were obtained with bolus tracking in order to opacify the pulmonary arteries. Thick section coronal MIP 3D reconstructions were performed  on a separate workstation. COMPARISON:none FINDINGS:  Pulmonary arteries: Intraluminal filling defect, junction interlobar and segmental branch, left lower lobe. Thoracic aorta: Normal in course and caliber. Cardiac Size: Normal. Pericardial effusion: None. Right lung: Emphysematous change with superimposed peripheral groundglass opacity right upper, mid, and lower lobe, and subsegmental dependent consolidation, right lower lobe. No nodules, masses, pleural effusion. Left lung: Emphysematous change with peripheral diffuse groundglass opacity, left upper lobe, with left lower lobe subsegmental consolidation and groundglass opacity. No nodules, masses, pleural effusion Lymph nodes: 1.3 cm right hilar lymph node. 1 cm left hilar lymph node. 1.1 cm prevascular lymph node. No axillary lymph node enlargement. Upper abdomen:Limited imaging upper abdomen shows no gross anomaly. Musculoskeletal:No osteoblastic, and no osteolytic lesions. Acute pulmonary embolism, junction left interlobar and left segmental artery, left lower lobe. Bilateral groundglass pulmonary opacities as discussed. This finding may be seen in patients with covid 19 pneumonia. However, other infectious and inflammatory etiologies may result in a similar radiographic appearance. Bilateral lower lung subsegmental atelectasis/pneumonia Emphysema. Bilateral hilar lymph node enlargement as described.  All CT scans at

## 2022-01-16 NOTE — PROGRESS NOTES
Progress Note  Date:2022       Room:W468/W468-01  Patient Thu Gómez     YOB: 1955     Age:66 y.o. Subjective    Subjective:  Symptoms:  He reports shortness of breath and cough. No malaise, chest pain, weakness, headache, chest pressure, anorexia or diarrhea. Diet:  No nausea or vomiting. Review of Systems   Respiratory: Positive for cough and shortness of breath. Cardiovascular: Negative for chest pain. Gastrointestinal: Negative for anorexia, diarrhea, nausea and vomiting. Neurological: Negative for weakness. Objective         Vitals Last 24 Hours:  TEMPERATURE:  Temp  Av.4 °F (36.3 °C)  Min: 97.2 °F (36.2 °C)  Max: 97.7 °F (36.5 °C)  RESPIRATIONS RANGE: Resp  Av  Min: 18  Max: 26  PULSE OXIMETRY RANGE: SpO2  Av.4 %  Min: 90 %  Max: 94 %  PULSE RANGE: Pulse  Av.3  Min: 68  Max: 96  BLOOD PRESSURE RANGE: Systolic (70RWN), SZB:783 , Min:123 , GGA:187   ; Diastolic (72GWZ), PDM:23, Min:73, Max:163    I/O (24Hr): Intake/Output Summary (Last 24 hours) at 2022 0955  Last data filed at 2022 4505  Gross per 24 hour   Intake 171 ml   Output --   Net 171 ml     Objective:  General Appearance:  Comfortable, well-appearing and in no acute distress. Vital signs: (most recent): Blood pressure 125/73, pulse 68, temperature 97.7 °F (36.5 °C), temperature source Oral, resp. rate 18, SpO2 90 %. HEENT: Normal HEENT exam.    Lungs: There are decreased breath sounds. Heart: Normal rate. Regular rhythm. S1 normal and S2 normal.    Abdomen: Abdomen is soft. Bowel sounds are normal.   There is no epigastric area or suprapubic area tenderness. Pulses: Distal pulses are intact. Neurological: Patient is alert and oriented to person, place and time. Pupils:  Pupils are equal, round, and reactive to light. Skin:  Warm.       Labs/Imaging/Diagnostics    Labs:  CBC:  Recent Labs     01/15/22  1041 01/15/22  2032   WBC 3.3* 2.1* RBC 5.18 4.79   HGB 15.2 14.0   HCT 42.2 39.6*   MCV 81.5 82.6   RDW 13.2 13.9   * 120*     CHEMISTRIES:  Recent Labs     01/15/22  1041   *   K 3.7   CL 95   CO2 21   BUN 22   CREATININE 0.97   GLUCOSE 122*     PT/INR:  Recent Labs     01/15/22  1041 01/15/22  2032   PROTIME 13.7 14.0   INR 1.1 1.1     APTT:  Recent Labs     01/15/22  1041 01/15/22  2032   APTT 29.5 43.4*     LIVER PROFILE:  Recent Labs     01/15/22  1041   AST 52*   ALT 20   BILITOT 1.3*   ALKPHOS 90       Imaging Last 24 Hours:  CTA CHEST W WO CONTRAST - r/o Pulmonary Embolism    Result Date: 1/15/2022  CT PULMONARY ANGIOGRAM WITH INTRAVENOUS CONTRAST MEDIUM. REASON FOR EXAMINATION:  SHORTNESS OF BREATH, WORSENED OVER LAST 2 DAYS. TECHNIQUE: Helical CTA was performed through the chest utilizing 100 ml of Isovue-370 intravenous contrast.  Images were obtained with bolus tracking in order to opacify the pulmonary arteries. Thick section coronal MIP 3D reconstructions were performed  on a separate workstation. COMPARISON:none FINDINGS:  Pulmonary arteries: Intraluminal filling defect, junction interlobar and segmental branch, left lower lobe. Thoracic aorta: Normal in course and caliber. Cardiac Size: Normal. Pericardial effusion: None. Right lung: Emphysematous change with superimposed peripheral groundglass opacity right upper, mid, and lower lobe, and subsegmental dependent consolidation, right lower lobe. No nodules, masses, pleural effusion. Left lung: Emphysematous change with peripheral diffuse groundglass opacity, left upper lobe, with left lower lobe subsegmental consolidation and groundglass opacity. No nodules, masses, pleural effusion Lymph nodes: 1.3 cm right hilar lymph node. 1 cm left hilar lymph node. 1.1 cm prevascular lymph node. No axillary lymph node enlargement. Upper abdomen:Limited imaging upper abdomen shows no gross anomaly. Musculoskeletal:No osteoblastic, and no osteolytic lesions.      Acute pulmonary embolism, junction left interlobar and left segmental artery, left lower lobe. Bilateral groundglass pulmonary opacities as discussed. This finding may be seen in patients with covid 19 pneumonia. However, other infectious and inflammatory etiologies may result in a similar radiographic appearance. Bilateral lower lung subsegmental atelectasis/pneumonia Emphysema. Bilateral hilar lymph node enlargement as described. All CT scans at this facility use dose modulation, iterative reconstruction, and/or weight based dosing when appropriate to reduce radiation dose to as low as reasonably achievable. XR CHEST PORTABLE    Result Date: 1/15/2022  EXAMINATION: XR CHEST PORTABLE CLINICAL HISTORY: SHORTNESS OF BREATH COMPARISONS: None available. FINDINGS: Patient leaning to right. Osseous structures intact. Cardiopericardial silhouette normal. Coarse lung markings lung base. LEFT LOWER LUNG ATELECTASIS/PNEUMONIA. Assessment//Plan           Hospital Problems           Last Modified POA    COVID-19 1/15/2022 Yes    Pulmonary embolism ([address] 1/15/2022 Yes    HLD 1/15/2022 Yes    PE (pulmonary thromboembolism) ([address] 1/16/2022 Yes        Assessment & Plan  1/16: Change heparin drip to subcu Lovenox. For acute PE. Follow-up pulmonary evaluation for acute respiratory failure with hypoxia. Patient does not use oxygen at home. Continue remdesivir. Patient did not get vaccination. LE doppler to r/o DVT, no overnight events.   Electronically signed by July Eller MD on 1/16/22 at 9:55 AM EST

## 2022-01-17 LAB
ALBUMIN SERPL-MCNC: 3.2 G/DL (ref 3.5–4.6)
ALP BLD-CCNC: 84 U/L (ref 35–104)
ALT SERPL-CCNC: 30 U/L (ref 0–41)
ANION GAP SERPL CALCULATED.3IONS-SCNC: 10 MEQ/L (ref 9–15)
AST SERPL-CCNC: 33 U/L (ref 0–40)
BASOPHILS ABSOLUTE: 0 K/UL (ref 0–0.2)
BASOPHILS RELATIVE PERCENT: 0.1 %
BILIRUB SERPL-MCNC: 0.8 MG/DL (ref 0.2–0.7)
BUN BLDV-MCNC: 18 MG/DL (ref 8–23)
CALCIUM SERPL-MCNC: 8.8 MG/DL (ref 8.5–9.9)
CHLORIDE BLD-SCNC: 104 MEQ/L (ref 95–107)
CO2: 23 MEQ/L (ref 20–31)
CREAT SERPL-MCNC: 0.74 MG/DL (ref 0.7–1.2)
EOSINOPHILS ABSOLUTE: 0 K/UL (ref 0–0.7)
EOSINOPHILS RELATIVE PERCENT: 0.1 %
GFR AFRICAN AMERICAN: >60
GFR NON-AFRICAN AMERICAN: >60
GLOBULIN: 2.9 G/DL (ref 2.3–3.5)
GLUCOSE BLD-MCNC: 98 MG/DL (ref 70–99)
HCT VFR BLD CALC: 37.7 % (ref 42–52)
HEMOGLOBIN: 13.4 G/DL (ref 14–18)
LYMPHOCYTES ABSOLUTE: 0.5 K/UL (ref 1–4.8)
LYMPHOCYTES RELATIVE PERCENT: 10.7 %
MCH RBC QN AUTO: 29.4 PG (ref 27–31.3)
MCHC RBC AUTO-ENTMCNC: 35.6 % (ref 33–37)
MCV RBC AUTO: 82.5 FL (ref 80–100)
MONOCYTES ABSOLUTE: 0.4 K/UL (ref 0.2–0.8)
MONOCYTES RELATIVE PERCENT: 8.8 %
NEUTROPHILS ABSOLUTE: 4.1 K/UL (ref 1.4–6.5)
NEUTROPHILS RELATIVE PERCENT: 80.3 %
PDW BLD-RTO: 14.1 % (ref 11.5–14.5)
PLATELET # BLD: 182 K/UL (ref 130–400)
POTASSIUM SERPL-SCNC: 4.4 MEQ/L (ref 3.4–4.9)
RBC # BLD: 4.57 M/UL (ref 4.7–6.1)
SODIUM BLD-SCNC: 137 MEQ/L (ref 135–144)
TOTAL PROTEIN: 6.1 G/DL (ref 6.3–8)
WBC # BLD: 5.1 K/UL (ref 4.8–10.8)

## 2022-01-17 PROCEDURE — 36415 COLL VENOUS BLD VENIPUNCTURE: CPT

## 2022-01-17 PROCEDURE — 6370000000 HC RX 637 (ALT 250 FOR IP): Performed by: NURSE PRACTITIONER

## 2022-01-17 PROCEDURE — 2580000003 HC RX 258: Performed by: NURSE PRACTITIONER

## 2022-01-17 PROCEDURE — 2700000000 HC OXYGEN THERAPY PER DAY

## 2022-01-17 PROCEDURE — 6360000002 HC RX W HCPCS: Performed by: INTERNAL MEDICINE

## 2022-01-17 PROCEDURE — 80053 COMPREHEN METABOLIC PANEL: CPT

## 2022-01-17 PROCEDURE — 99232 SBSQ HOSP IP/OBS MODERATE 35: CPT | Performed by: INTERNAL MEDICINE

## 2022-01-17 PROCEDURE — 85025 COMPLETE CBC W/AUTO DIFF WBC: CPT

## 2022-01-17 PROCEDURE — 1210000000 HC MED SURG R&B

## 2022-01-17 PROCEDURE — 2060000000 HC ICU INTERMEDIATE R&B

## 2022-01-17 RX ADMIN — ENOXAPARIN SODIUM 100 MG: 100 INJECTION SUBCUTANEOUS at 09:28

## 2022-01-17 RX ADMIN — DEXAMETHASONE 6 MG: 6 TABLET ORAL at 09:28

## 2022-01-17 RX ADMIN — ENOXAPARIN SODIUM 100 MG: 100 INJECTION SUBCUTANEOUS at 20:36

## 2022-01-17 RX ADMIN — Medication 10 ML: at 20:36

## 2022-01-17 RX ADMIN — Medication 6000 UNITS: at 09:28

## 2022-01-17 ASSESSMENT — ENCOUNTER SYMPTOMS
DIARRHEA: 0
NAUSEA: 0
SHORTNESS OF BREATH: 1
COUGH: 1
VOMITING: 0

## 2022-01-17 NOTE — ACP (ADVANCE CARE PLANNING)
Advance Care Planning     Advance Care Planning Activator (Inpatient)  Conversation Note      Date of ACP Conversation: 1/17/2022     Conversation Conducted with: Patient with Decision Making Capacity    ACP Activator: Ninfa Lock RN      Health Care Decision Maker:     Current Designated Health Care Decision Maker:     Primary Decision Maker: Silvio Carlos [facility] - [phone]  Click here to complete Healthcare Decision Makers including section of the Healthcare Decision Maker Relationship (ie \"Primary\")  Today we documented Decision Maker(s) consistent with Legal Next of Kin hierarchy. Care Preferences    Ventilation: \"If you were in your present state of health and suddenly became very ill and were unable to breathe on your own, what would your preference be about the use of a ventilator (breathing machine) if it were available to you? \"      Would the patient desire the use of ventilator (breathing machine)?: yes    \"If your health worsens and it becomes clear that your chance of recovery is unlikely, what would your preference be about the use of a ventilator (breathing machine) if it were available to you? \"     Would the patient desire the use of ventilator (breathing machine)?: No      Resuscitation  \"CPR works best to restart the heart when there is a sudden event, like a heart attack, in someone who is otherwise healthy. Unfortunately, CPR does not typically restart the heart for people who have serious health conditions or who are very sick. \"    \"In the event your heart stopped as a result of an underlying serious health condition, would you want attempts to be made to restart your heart (answer \"yes\" for attempt to resuscitate) or would you prefer a natural death (answer \"no\" for do not attempt to resuscitate)? \" yes       [] Yes   [x] No   Educated Patient / [employer] regarding differences between Advance Directives and portable DNR orders.     Length of ACP Conversation in minutes: Conversation Outcomes:  [x] ACP discussion completed  [] Existing advance directive reviewed with patient; no changes to patient's previously recorded wishes  [] New Advance Directive completed  [] Portable Do Not Rescitate prepared for Provider review and signature  [] POLST/POST/MOLST/MOST prepared for Provider review and signature      Follow-up plan:    [] Schedule follow-up conversation to continue planning  [] Referred individual to Provider for additional questions/concerns   [] Advised patient/agent/surrogate to review completed ACP document and update if needed with changes in condition, patient preferences or care setting    [] This note routed to one or more involved healthcare providers

## 2022-01-17 NOTE — CARE COORDINATION
[facility] EMERGENCY [facility] CENTER AT [place] Case Management Initial Discharge Assessment    Met with Patient to discuss discharge plan. PCP: Hasmukh Ledesma MD        te of Last Visit: 3 years ago    If no PCP, list provided? N/A    Discharge Planning    Living Arrangements: independently at home    Who do you live with? SELF    Who helps you with your care:  self    If lives at home:     Do you have any barriers navigating in your home? yes - STAIRS    Patient can perform ADL? Yes    Current Services (outpatient and in home) :  None    Dialysis: No    Is transportation available to get to your appointments? Yes    DME Equipment:  no    Respiratory equipment: None    Respiratory provider:  no     Pharmacy:  yes - [facility] [place] [address] [facility] with Medication Assistance Program?  No      Patient agreeable to [place] [address]? N/A    Patient agreeable to SNF/Rehab? N/A    Other discharge needs identified? Other TBD    Does Patient Have a High-Risk for Readmission Diagnosis (CHF, PN, MI, COPD)? No    Initial Discharge Plan? (Note: please see concurrent daily documentation for any updates after initial note). BD    Readmission Risk              Risk of Unplanned Readmission:  10         Electronically signed by Keivn Lock RN on 1/17/2022 at 12:20 PM

## 2022-01-17 NOTE — PROGRESS NOTES
Progress Note  Date:2022       [facility]  Patient Marge Menchaca     YOB: 1955     Age:66 y.o. Subjective    Subjective:  Symptoms:  He reports shortness of breath and cough. No malaise, chest pain, weakness, headache, chest pressure, anorexia or diarrhea. Diet:  No nausea or vomiting. Review of Systems   Respiratory: Positive for cough and shortness of breath. Cardiovascular: Negative for chest pain. Gastrointestinal: Negative for anorexia, diarrhea, nausea and vomiting. Neurological: Negative for weakness. Objective         Vitals Last 24 Hours:  TEMPERATURE:  Temp  Av.5 °F (36.4 °C)  Min: 97.3 °F (36.3 °C)  Max: 97.7 °F (36.5 °C)  RESPIRATIONS RANGE: Resp  Av  Min: 18  Max: 18  PULSE OXIMETRY RANGE: SpO2  Av.3 %  Min: 90 %  Max: 95 %  PULSE RANGE: Pulse  Av.3  Min: 70  Max: 84  BLOOD PRESSURE RANGE: Systolic (58KHC), WHX:094 , Min:125 , VQD:209   ; Diastolic (26LPY), TWZ:23, Min:66, Max:82    I/O (24Hr): No intake or output data in the 24 hours ending 22 1018  Objective:  General Appearance:  Comfortable, well-appearing and in no acute distress. Vital signs: (most recent): Blood pressure 128/66, pulse 70, temperature 97.3 °F (36.3 °C), temperature source Oral, resp. rate 18, SpO2 91 %. HEENT: Normal HEENT exam.    Lungs: There are decreased breath sounds. Heart: Normal rate. Regular rhythm. S1 normal and S2 normal.    Abdomen: Abdomen is soft. Bowel sounds are normal.   There is no epigastric area or suprapubic area tenderness. Pulses: Distal pulses are intact. Neurological: Patient is alert and oriented to person, place and time. Pupils:  Pupils are equal, round, and reactive to light. Skin:  Warm.       Labs/Imaging/Diagnostics    Labs:  CBC:  Recent Labs     01/15/22  2032 22  1104 22  0652   WBC 2.1* 4.2* 5.1   RBC 4.79 4.51* 4.57*   HGB 14.0 13.3* 13.4*   HCT 39.6* 37.4* 37.7*   MCV 82.6 83.1 82.5   RDW 13.9 13.9 14.1   * 136 182     CHEMISTRIES:  Recent Labs     01/15/22  1041 01/17/22  0652   * 137   K 3.7 4.4   CL 95 104   CO2 21 23   BUN 22 18   CREATININE 0.97 0.74   GLUCOSE 122* 98     PT/INR:  Recent Labs     01/15/22  1041 01/15/22  2032   PROTIME 13.7 14.0   INR 1.1 1.1     APTT:  Recent Labs     01/15/22  1041 01/15/22  2032   APTT 29.5 43.4*     LIVER PROFILE:  Recent Labs     01/15/22  1041 01/17/22  0652   AST 52* 33   ALT 20 30   BILITOT 1.3* 0.8*   ALKPHOS 90 84       Imaging Last 24 Hours:  CTA CHEST W WO CONTRAST - r/o Pulmonary Embolism    Result Date: 1/15/2022  CT PULMONARY ANGIOGRAM WITH INTRAVENOUS CONTRAST MEDIUM. REASON FOR EXAMINATION:  SHORTNESS OF BREATH, WORSENED OVER LAST 2 DAYS. TECHNIQUE: Helical CTA was performed through the chest utilizing 100 ml of Isovue-370 intravenous contrast.  Images were obtained with bolus tracking in order to opacify the pulmonary arteries. Thick section coronal MIP 3D reconstructions were performed  on a separate workstation. COMPARISON:none FINDINGS:  Pulmonary arteries: Intraluminal filling defect, junction interlobar and segmental branch, left lower lobe. Thoracic aorta: Normal in course and caliber. Cardiac Size: Normal. Pericardial effusion: None. Right lung: Emphysematous change with superimposed peripheral groundglass opacity right upper, mid, and lower lobe, and subsegmental dependent consolidation, right lower lobe. No nodules, masses, pleural effusion. Left lung: Emphysematous change with peripheral diffuse groundglass opacity, left upper lobe, with left lower lobe subsegmental consolidation and groundglass opacity. No nodules, masses, pleural effusion Lymph nodes: 1.3 cm right hilar lymph node. 1 cm left hilar lymph node. 1.1 cm prevascular lymph node. No axillary lymph node enlargement. Upper abdomen:Limited imaging upper abdomen shows no gross anomaly. Musculoskeletal:No osteoblastic, and no osteolytic lesions. Acute pulmonary embolism, junction left interlobar and left segmental artery, left lower lobe. Bilateral groundglass pulmonary opacities as discussed. This finding may be seen in patients with covid 19 pneumonia. However, other infectious and inflammatory etiologies may result in a similar radiographic appearance. Bilateral lower lung subsegmental atelectasis/pneumonia Emphysema. Bilateral hilar lymph node enlargement as described. All CT scans at this facility use dose modulation, iterative reconstruction, and/or weight based dosing when appropriate to reduce radiation dose to as low as reasonably achievable. XR CHEST PORTABLE    Result Date: 1/15/2022  EXAMINATION: XR CHEST PORTABLE CLINICAL HISTORY: SHORTNESS OF BREATH COMPARISONS: None available. FINDINGS: Patient leaning to right. Osseous structures intact. Cardiopericardial silhouette normal. Coarse lung markings lung base. LEFT LOWER LUNG ATELECTASIS/PNEUMONIA. Assessment//Plan           Hospital Problems           Last Modified POA    COVID-19 1/15/2022 Yes    Pulmonary embolism ([address] 1/15/2022 Yes    HLD 1/15/2022 Yes    PE (pulmonary thromboembolism) ([address] 1/16/2022 Yes        Assessment & Plan    1/16: Change heparin drip to subcu Lovenox. For acute PE. Follow-up pulmonary evaluation for acute respiratory failure with hypoxia. Patient does not use oxygen at home. Continue remdesivir. Patient did not get vaccination. LE doppler to r/o DVT, no overnight events. 1/17: Patient more hypoxic than before. Oxygen therapy is at 8 L. Patient was using the restroom without oxygen. Spoke with nursing to have a tubing long enough for him to use the restroom. Counseling was given to the patient. Follow-up pulmonary. Continue with anticoagulation and Decadron. Remdesivir was discontinued by pulmonary.   Electronically signed by Moisés Bardales MD on 1/16/22 at 9:55 AM EST

## 2022-01-17 NOTE — PROGRESS NOTES
Pt assessment and vitals complete. Patient c/o feeling more sob than yesterday. spo2 increased to 8L NC. Dr. Marcy Bosch and Austin Mchugh RT notified, pt refusing to use a commode or a bed mcginnis and per Dr. Marcy Bosch, that is okay.

## 2022-01-17 NOTE — PROGRESS NOTES
Assessment complete. Patient is alert and oriented. VSS. Denies any SOB, dizziness, N/V, or pain. Lung sounds are diminished. Denies any other needs and call light is within reach.   Electronically signed by Naun Valadez RN on 1/16/2022 at 8:44 PM

## 2022-01-17 NOTE — CARE COORDINATION
Pneumonia and Covid information delivered to patient by primary care team due to patient isolation status.    Electronically signed by Ashlee Rodriguez RN on 1/17/2022 at 2:16 PM

## 2022-01-18 LAB
ALBUMIN SERPL-MCNC: 3 G/DL (ref 3.5–4.6)
ALP BLD-CCNC: 77 U/L (ref 35–104)
ALT SERPL-CCNC: 28 U/L (ref 0–41)
ANION GAP SERPL CALCULATED.3IONS-SCNC: 12 MEQ/L (ref 9–15)
AST SERPL-CCNC: 22 U/L (ref 0–40)
BASOPHILS ABSOLUTE: 0 K/UL (ref 0–0.2)
BASOPHILS RELATIVE PERCENT: 0.1 %
BILIRUB SERPL-MCNC: 1 MG/DL (ref 0.2–0.7)
BUN BLDV-MCNC: 16 MG/DL (ref 8–23)
CALCIUM SERPL-MCNC: 8.7 MG/DL (ref 8.5–9.9)
CHLORIDE BLD-SCNC: 103 MEQ/L (ref 95–107)
CO2: 21 MEQ/L (ref 20–31)
CREAT SERPL-MCNC: 0.65 MG/DL (ref 0.7–1.2)
EOSINOPHILS ABSOLUTE: 0 K/UL (ref 0–0.7)
EOSINOPHILS RELATIVE PERCENT: 0.3 %
FERRITIN: 3953 UG/L (ref 30–400)
GFR AFRICAN AMERICAN: >60
GFR NON-AFRICAN AMERICAN: >60
GLOBULIN: 2.8 G/DL (ref 2.3–3.5)
GLUCOSE BLD-MCNC: 96 MG/DL (ref 70–99)
HCT VFR BLD CALC: 36.4 % (ref 42–52)
HEMOGLOBIN: 12.8 G/DL (ref 14–18)
LYMPHOCYTES ABSOLUTE: 0.6 K/UL (ref 1–4.8)
LYMPHOCYTES RELATIVE PERCENT: 13 %
MCH RBC QN AUTO: 29 PG (ref 27–31.3)
MCHC RBC AUTO-ENTMCNC: 35.2 % (ref 33–37)
MCV RBC AUTO: 82.3 FL (ref 80–100)
MONOCYTES ABSOLUTE: 0.3 K/UL (ref 0.2–0.8)
MONOCYTES RELATIVE PERCENT: 7.1 %
NEUTROPHILS ABSOLUTE: 3.9 K/UL (ref 1.4–6.5)
NEUTROPHILS RELATIVE PERCENT: 79.5 %
PDW BLD-RTO: 14.1 % (ref 11.5–14.5)
PLATELET # BLD: 190 K/UL (ref 130–400)
POTASSIUM SERPL-SCNC: 4.2 MEQ/L (ref 3.4–4.9)
RBC # BLD: 4.42 M/UL (ref 4.7–6.1)
SODIUM BLD-SCNC: 136 MEQ/L (ref 135–144)
TOTAL PROTEIN: 5.8 G/DL (ref 6.3–8)
VITAMIN D 25-HYDROXY: 57.3 NG/ML (ref 30–100)
WBC # BLD: 4.9 K/UL (ref 4.8–10.8)

## 2022-01-18 PROCEDURE — 2700000000 HC OXYGEN THERAPY PER DAY

## 2022-01-18 PROCEDURE — 80053 COMPREHEN METABOLIC PANEL: CPT

## 2022-01-18 PROCEDURE — 99232 SBSQ HOSP IP/OBS MODERATE 35: CPT | Performed by: INTERNAL MEDICINE

## 2022-01-18 PROCEDURE — 36415 COLL VENOUS BLD VENIPUNCTURE: CPT

## 2022-01-18 PROCEDURE — 2060000000 HC ICU INTERMEDIATE R&B

## 2022-01-18 PROCEDURE — 6370000000 HC RX 637 (ALT 250 FOR IP): Performed by: INTERNAL MEDICINE

## 2022-01-18 PROCEDURE — 85025 COMPLETE CBC W/AUTO DIFF WBC: CPT

## 2022-01-18 PROCEDURE — 2580000003 HC RX 258: Performed by: NURSE PRACTITIONER

## 2022-01-18 PROCEDURE — 6370000000 HC RX 637 (ALT 250 FOR IP): Performed by: NURSE PRACTITIONER

## 2022-01-18 RX ADMIN — Medication 6000 UNITS: at 09:24

## 2022-01-18 RX ADMIN — APIXABAN 10 MG: 5 TABLET, FILM COATED ORAL at 20:52

## 2022-01-18 RX ADMIN — APIXABAN 10 MG: 5 TABLET, FILM COATED ORAL at 12:47

## 2022-01-18 RX ADMIN — Medication 10 ML: at 20:52

## 2022-01-18 RX ADMIN — DEXAMETHASONE 6 MG: 6 TABLET ORAL at 09:23

## 2022-01-18 RX ADMIN — Medication 10 ML: at 09:23

## 2022-01-18 ASSESSMENT — ENCOUNTER SYMPTOMS
DIARRHEA: 0
VOMITING: 0
NAUSEA: 0
COUGH: 1
SHORTNESS OF BREATH: 1

## 2022-01-18 NOTE — CARE COORDINATION
This Care Transition Nurse phoned patient and reviewed pneumonia booklet and zones sheet which were previously provided. Discussed the use of zones sheet. Goal is to be in the green zone. Stressed importance of phoning physician promptly for symptoms in the yellow zone and ems for symptoms in the red zone. Discussed importance of taking antibiotics, steroids, anti-coagulants as ordered, drinking plenty of fluids,especially water, coughing and deep breathing, continue to use incentive spirometer and acapella at home (if ordered), get adequate rest and eat a well balanced diet, good handwashing, masking, avoiding ill contacts, quarantine, disposing of used tissues in the proper receptacle, avoid drinking alcoholic beverages, vaccines. Stressed  importance of physician follow up within one week of discharge. Patient voiced understanding. Patient does not smoke or drink alcohol. He is not exposed to second hand smoke. Patient states his appetite is improving sl;ightly. Advised to eat small, frequent meals and add Ensure or Boost if needed. He is drinking plenty of fluids. He lives alone but has a daughter who can drop off items he may need while he is in quarantine.

## 2022-01-18 NOTE — PROGRESS NOTES
Progress Note  Date:2022       Room:W468/W468-01  Patient Smitha Duggan     YOB: 1955     Age:66 y.o. Subjective    Subjective:  Symptoms:  He reports shortness of breath and cough. No malaise, chest pain, weakness, headache, chest pressure, anorexia or diarrhea. Diet:  No nausea or vomiting. Review of Systems   Respiratory: Positive for cough and shortness of breath. Cardiovascular: Negative for chest pain. Gastrointestinal: Negative for anorexia, diarrhea, nausea and vomiting. Neurological: Negative for weakness. Objective         Vitals Last 24 Hours:  TEMPERATURE:  Temp  Av.7 °F (36.5 °C)  Min: 97.5 °F (36.4 °C)  Max: 97.9 °F (36.6 °C)  RESPIRATIONS RANGE: Resp  Av  Min: 20  Max: 20  PULSE OXIMETRY RANGE: SpO2  Av %  Min: 93 %  Max: 95 %  PULSE RANGE: Pulse  Av  Min: 84  Max: 88  BLOOD PRESSURE RANGE: Systolic (81ZFG), QQD:229 , Min:130 , KQI:042   ; Diastolic (20ENQ), OSJ:67, Min:83, Max:91    I/O (24Hr): Intake/Output Summary (Last 24 hours) at 2022 1019  Last data filed at 2022 0509  Gross per 24 hour   Intake 860 ml   Output --   Net 860 ml     Objective:  General Appearance:  Comfortable, well-appearing and in no acute distress. Vital signs: (most recent): Blood pressure (!) 130/91, pulse 84, temperature 97.5 °F (36.4 °C), temperature source Oral, resp. rate 20, SpO2 93 %. HEENT: Normal HEENT exam.    Lungs: There are decreased breath sounds. Heart: Normal rate. Regular rhythm. S1 normal and S2 normal.    Abdomen: Abdomen is soft. Bowel sounds are normal.   There is no epigastric area or suprapubic area tenderness. Pulses: Distal pulses are intact. Neurological: Patient is alert and oriented to person, place and time. Pupils:  Pupils are equal, round, and reactive to light. Skin:  Warm.       Labs/Imaging/Diagnostics    Labs:  CBC:  Recent Labs     22  1104 22  0652 22  0602   WBC 4. 2* 5.1 4.9   RBC 4.51* 4.57* 4.42*   HGB 13.3* 13.4* 12.8*   HCT 37.4* 37.7* 36.4*   MCV 83.1 82.5 82.3   RDW 13.9 14.1 14.1    182 190     CHEMISTRIES:  Recent Labs     01/15/22  1041 01/17/22  0652 01/18/22  0602   * 137 136   K 3.7 4.4 4.2   CL 95 104 103   CO2 21 23 21   BUN 22 18 16   CREATININE 0.97 0.74 0.65*   GLUCOSE 122* 98 96     PT/INR:  Recent Labs     01/15/22  1041 01/15/22  2032   PROTIME 13.7 14.0   INR 1.1 1.1     APTT:  Recent Labs     01/15/22  1041 01/15/22  2032   APTT 29.5 43.4*     LIVER PROFILE:  Recent Labs     01/15/22  1041 01/17/22  0652 01/18/22  0602   AST 52* 33 22   ALT 20 30 28   BILITOT 1.3* 0.8* 1.0*   ALKPHOS 90 84 77       Imaging Last 24 Hours:  CTA CHEST W WO CONTRAST - r/o Pulmonary Embolism    Result Date: 1/15/2022  CT PULMONARY ANGIOGRAM WITH INTRAVENOUS CONTRAST MEDIUM. REASON FOR EXAMINATION:  SHORTNESS OF BREATH, WORSENED OVER LAST 2 DAYS. TECHNIQUE: Helical CTA was performed through the chest utilizing 100 ml of Isovue-370 intravenous contrast.  Images were obtained with bolus tracking in order to opacify the pulmonary arteries. Thick section coronal MIP 3D reconstructions were performed  on a separate workstation. COMPARISON:none FINDINGS:  Pulmonary arteries: Intraluminal filling defect, junction interlobar and segmental branch, left lower lobe. Thoracic aorta: Normal in course and caliber. Cardiac Size: Normal. Pericardial effusion: None. Right lung: Emphysematous change with superimposed peripheral groundglass opacity right upper, mid, and lower lobe, and subsegmental dependent consolidation, right lower lobe. No nodules, masses, pleural effusion. Left lung: Emphysematous change with peripheral diffuse groundglass opacity, left upper lobe, with left lower lobe subsegmental consolidation and groundglass opacity. No nodules, masses, pleural effusion Lymph nodes: 1.3 cm right hilar lymph node. 1 cm left hilar lymph node. 1.1 cm prevascular lymph node. No axillary lymph node enlargement. Upper abdomen:Limited imaging upper abdomen shows no gross anomaly. Musculoskeletal:No osteoblastic, and no osteolytic lesions. Acute pulmonary embolism, junction left interlobar and left segmental artery, left lower lobe. Bilateral groundglass pulmonary opacities as discussed. This finding may be seen in patients with covid 19 pneumonia. However, other infectious and inflammatory etiologies may result in a similar radiographic appearance. Bilateral lower lung subsegmental atelectasis/pneumonia Emphysema. Bilateral hilar lymph node enlargement as described. All CT scans at this facility use dose modulation, iterative reconstruction, and/or weight based dosing when appropriate to reduce radiation dose to as low as reasonably achievable. XR CHEST PORTABLE    Result Date: 1/15/2022  EXAMINATION: XR CHEST PORTABLE CLINICAL HISTORY: SHORTNESS OF BREATH COMPARISONS: None available. FINDINGS: Patient leaning to right. Osseous structures intact. Cardiopericardial silhouette normal. Coarse lung markings lung base. LEFT LOWER LUNG ATELECTASIS/PNEUMONIA. Assessment//Plan           Hospital Problems           Last Modified POA    COVID-19 1/15/2022 Yes    Pulmonary embolism ([address] 1/15/2022 Yes    HLD 1/15/2022 Yes    PE (pulmonary thromboembolism) ([address] 1/16/2022 Yes        Assessment & Plan    1/16: Change heparin drip to subcu Lovenox. For acute PE. Follow-up pulmonary evaluation for acute respiratory failure with hypoxia. Patient does not use oxygen at home. Continue remdesivir. Patient did not get vaccination. LE doppler to r/o DVT, no overnight events. 1/17: Patient more hypoxic than before. Oxygen therapy is at 8 L. Patient was using the restroom without oxygen. Spoke with nursing to have a tubing long enough for him to use the restroom. Counseling was given to the patient. Follow-up pulmonary. Continue with anticoagulation and Decadron.   Remdesivir was discontinued by pulmonary. 1/18: Start full dose anticoagulation p.o. and DC subcu Lovenox. Patient oxygen saturation improving. DC telemetry now. Anticipate discharge within 1 to 2 days. Spoke with nursing about the care.   Electronically signed by Nia Downs MD on 1/16/22 at 9:55 AM EST

## 2022-01-18 NOTE — CARE COORDINATION
This LSW spoke with patient via phone this afternoon. Patient states that he is feeling good. He confirmed that he is going to return home upon discharge. Patient does have family and friends that can assist with groceries and medications, transportation. Home 02 eval to be completed prior to discharge.   Electronically signed by OPAL Rosen, LSW on 1/18/22 at 2:38 PM EST

## 2022-01-18 NOTE — PROGRESS NOTES
INPATIENT PROGRESS NOTES    PATIENT NAME: Haider Benites  MRN: [id number]  SERVICE DATE:  January 17, 2022   SERVICE TIME:  8:48 PM      PRIMARY SERVICE: Pulmonary Disease    CHIEF COMPLAIN: COVID-19 pneumonia      INTERVAL HPI: Patient seen and examined at bedside, Interval Notes, orders reviewed. Nursing notes noted  Patient is feeling better. No fever or chills. No nausea vomiting diarrhea or abdominal pain. He has cough with clear mucus. He has shortness of breath with any exertion. OBJECTIVE    There is no height or weight on file to calculate BMI. PHYSICAL EXAM:  Vitals:  /83   Pulse 88   Temp 97.9 °F (36.6 °C) (Oral)   Resp 20   SpO2 94%   General: Alert, awake . comfortable in bed, No distress. Head: Atraumatic , Normocephalic   Eyes: PERRL. No sclera icterus. No conjunctival injection. No discharge   ENT: No nasal  discharge. Pharynx clear. Neck:  Trachea midline. No thyromegaly, no JVD, No cervical adenopathy. Chest : Bilaterally symmetrical ,Normal effort,  No accessory muscle use  Lung : . Fair BS bilateral, decreased BS at bases. No Rales. No wheezing. No rhonchi. Heart[de-identified] Normal  rate. Regular rhythm. No mumur ,  Rub or gallop  ABD: Non-tender. Non-distended. No masses. No organmegaly. Normal bowel sounds. No hernia.   Ext : No Pitting both leg , No Cyanosis No clubbing  Neuro: no focal weakness          DATA:   Recent Labs     01/16/22  1104 01/17/22  0652   WBC 4.2* 5.1   HGB 13.3* 13.4*   HCT 37.4* 37.7*   MCV 83.1 82.5    182     Recent Labs     01/15/22  1041 01/15/22  1041 01/17/22  0652   *  --  137   K 3.7  --  4.4   CL 95  --  104   CO2 21  --  23   BUN 22  --  18   CREATININE 0.97  --  0.74   GLUCOSE 122*  --  98   CALCIUM 8.8  --  8.8   PROT 6.8  --  6.1*   LABALBU 3.8  --  3.2*   BILITOT 1.3*  --  0.8*   ALKPHOS 90  --  84   AST 52*  --  33   ALT 20   < > 30   LABGLOM >60.0   < > >60.0   GFRAA >60.0   < > >60.0   GLOB 3.0   < > 2.9    < > = values in this interval not displayed. MV Settings:          No results for input(s): PHART, UPX3KKY, PO2ART, YIB3ZDH, BEART, Z6AIDTZC in the last 72 hours. O2 Device: Nasal cannula  O2 Flow Rate (L/min): 5 L/min     ADULT DIET; Regular     MEDICATIONS during current hospitalization:    Continuous Infusions:   sodium chloride      sodium chloride         Scheduled Meds:   enoxaparin  1 mg/kg SubCUTAneous BID    sodium chloride flush  5-40 mL IntraVENous 2 times per day    sodium chloride flush  5-40 mL IntraVENous 2 times per day    dexamethasone  6 mg Oral Daily    Vitamin D  6,000 Units Oral Daily    Followed by   Glenroy Zamora ON 1/22/2022] Vitamin D  2,000 Units Oral Daily       PRN Meds:sodium chloride flush, sodium chloride, ondansetron **OR** ondansetron, polyethylene glycol, acetaminophen **OR** acetaminophen, heparin (porcine), heparin (porcine), sodium chloride flush, sodium chloride, guaiFENesin-dextromethorphan, bisacodyl    Radiology  CTA CHEST W WO CONTRAST - r/o Pulmonary Embolism    Result Date: 1/15/2022  CT PULMONARY ANGIOGRAM WITH INTRAVENOUS CONTRAST MEDIUM. REASON FOR EXAMINATION:  SHORTNESS OF BREATH, WORSENED OVER LAST 2 DAYS. TECHNIQUE: Helical CTA was performed through the chest utilizing 100 ml of Isovue-370 intravenous contrast.  Images were obtained with bolus tracking in order to opacify the pulmonary arteries. Thick section coronal MIP 3D reconstructions were performed  on a separate workstation. COMPARISON:none FINDINGS:  Pulmonary arteries: Intraluminal filling defect, junction interlobar and segmental branch, left lower lobe. Thoracic aorta: Normal in course and caliber. Cardiac Size: Normal. Pericardial effusion: None. Right lung: Emphysematous change with superimposed peripheral groundglass opacity right upper, mid, and lower lobe, and subsegmental dependent consolidation, right lower lobe. No nodules, masses, pleural effusion.  Left lung: Emphysematous change with peripheral diffuse groundglass opacity, left upper lobe, with left lower lobe subsegmental consolidation and groundglass opacity. No nodules, masses, pleural effusion Lymph nodes: 1.3 cm right hilar lymph node. 1 cm left hilar lymph node. 1.1 cm prevascular lymph node. No axillary lymph node enlargement. Upper abdomen:Limited imaging upper abdomen shows no gross anomaly. Musculoskeletal:No osteoblastic, and no osteolytic lesions. Acute pulmonary embolism, junction left interlobar and left segmental artery, left lower lobe. Bilateral groundglass pulmonary opacities as discussed. This finding may be seen in patients with covid 19 pneumonia. However, other infectious and inflammatory etiologies may result in a similar radiographic appearance. Bilateral lower lung subsegmental atelectasis/pneumonia Emphysema. Bilateral hilar lymph node enlargement as described. All CT scans at this facility use dose modulation, iterative reconstruction, and/or weight based dosing when appropriate to reduce radiation dose to as low as reasonably achievable. XR CHEST PORTABLE    Result Date: 1/15/2022  EXAMINATION: XR CHEST PORTABLE CLINICAL HISTORY: SHORTNESS OF BREATH COMPARISONS: None available. FINDINGS: Patient leaning to right. Osseous structures intact. Cardiopericardial silhouette normal. Coarse lung markings lung base. LEFT LOWER LUNG ATELECTASIS/PNEUMONIA. US DUP LOWER EXTREMITIES BILATERAL VENOUS    Result Date: 1/16/2022  EXAMINATION: US DUP LOWER EXTREMITIES BILATERAL VENOUS CLINICAL HISTORY: SHORTNESS OF BREATH. FINDINGS: Bilateral compression, augmentation, and color flow identified at levels of bilateral common femoral veins, proximal, mid, and distal superficial femoral veins, and popliteal veins. Bilateral compression and color flow demonstrated within bilateral infrapopliteal venous vasculature. NO SONOGRAPHIC EVIDENCE, DEEP VEIN THROMBOSIS, BILATERAL LOWER EXTREMITIES. IMPRESSION AND SUGGESTION:  1.  Acute hypoxic

## 2022-01-19 VITALS
TEMPERATURE: 97.2 F | OXYGEN SATURATION: 93 % | HEART RATE: 87 BPM | RESPIRATION RATE: 18 BRPM | DIASTOLIC BLOOD PRESSURE: 75 MMHG | SYSTOLIC BLOOD PRESSURE: 123 MMHG

## 2022-01-19 LAB
ALBUMIN SERPL-MCNC: 3.1 G/DL (ref 3.5–4.6)
ALP BLD-CCNC: 82 U/L (ref 35–104)
ALT SERPL-CCNC: 36 U/L (ref 0–41)
ANION GAP SERPL CALCULATED.3IONS-SCNC: 11 MEQ/L (ref 9–15)
AST SERPL-CCNC: 24 U/L (ref 0–40)
BASOPHILS ABSOLUTE: 0 K/UL (ref 0–0.2)
BASOPHILS RELATIVE PERCENT: 0.1 %
BILIRUB SERPL-MCNC: 0.9 MG/DL (ref 0.2–0.7)
BUN BLDV-MCNC: 17 MG/DL (ref 8–23)
CALCIUM SERPL-MCNC: 9 MG/DL (ref 8.5–9.9)
CHLORIDE BLD-SCNC: 101 MEQ/L (ref 95–107)
CO2: 23 MEQ/L (ref 20–31)
CREAT SERPL-MCNC: 0.66 MG/DL (ref 0.7–1.2)
EOSINOPHILS ABSOLUTE: 0 K/UL (ref 0–0.7)
EOSINOPHILS RELATIVE PERCENT: 0.9 %
GFR AFRICAN AMERICAN: >60
GFR NON-AFRICAN AMERICAN: >60
GLOBULIN: 3 G/DL (ref 2.3–3.5)
GLUCOSE BLD-MCNC: 120 MG/DL (ref 70–99)
HCT VFR BLD CALC: 37.3 % (ref 42–52)
HEMOGLOBIN: 13.3 G/DL (ref 14–18)
LYMPHOCYTES ABSOLUTE: 0.7 K/UL (ref 1–4.8)
LYMPHOCYTES RELATIVE PERCENT: 12.5 %
MCH RBC QN AUTO: 29.3 PG (ref 27–31.3)
MCHC RBC AUTO-ENTMCNC: 35.7 % (ref 33–37)
MCV RBC AUTO: 82 FL (ref 80–100)
MONOCYTES ABSOLUTE: 0.4 K/UL (ref 0.2–0.8)
MONOCYTES RELATIVE PERCENT: 7.3 %
NEUTROPHILS ABSOLUTE: 4.2 K/UL (ref 1.4–6.5)
NEUTROPHILS RELATIVE PERCENT: 79.2 %
PDW BLD-RTO: 14 % (ref 11.5–14.5)
PLATELET # BLD: 226 K/UL (ref 130–400)
POTASSIUM SERPL-SCNC: 4.6 MEQ/L (ref 3.4–4.9)
RBC # BLD: 4.55 M/UL (ref 4.7–6.1)
SODIUM BLD-SCNC: 135 MEQ/L (ref 135–144)
TOTAL PROTEIN: 6.1 G/DL (ref 6.3–8)
WBC # BLD: 5.3 K/UL (ref 4.8–10.8)

## 2022-01-19 PROCEDURE — 36415 COLL VENOUS BLD VENIPUNCTURE: CPT

## 2022-01-19 PROCEDURE — 2700000000 HC OXYGEN THERAPY PER DAY

## 2022-01-19 PROCEDURE — 85025 COMPLETE CBC W/AUTO DIFF WBC: CPT

## 2022-01-19 PROCEDURE — 6370000000 HC RX 637 (ALT 250 FOR IP): Performed by: INTERNAL MEDICINE

## 2022-01-19 PROCEDURE — 94618 PULMONARY STRESS TESTING: CPT

## 2022-01-19 PROCEDURE — 99232 SBSQ HOSP IP/OBS MODERATE 35: CPT | Performed by: INTERNAL MEDICINE

## 2022-01-19 PROCEDURE — 6370000000 HC RX 637 (ALT 250 FOR IP): Performed by: NURSE PRACTITIONER

## 2022-01-19 PROCEDURE — 80053 COMPREHEN METABOLIC PANEL: CPT

## 2022-01-19 RX ORDER — BENZONATATE 100 MG/1
100 CAPSULE ORAL 2 TIMES DAILY PRN
Qty: 20 CAPSULE | Refills: 0 | Status: SHIPPED | OUTPATIENT
Start: 2022-01-19 | End: 2022-01-26

## 2022-01-19 RX ORDER — DEXAMETHASONE 6 MG/1
6 TABLET ORAL DAILY
Qty: 7 TABLET | Refills: 0 | Status: SHIPPED | OUTPATIENT
Start: 2022-01-19 | End: 2022-01-26

## 2022-01-19 RX ORDER — CHOLECALCIFEROL (VITAMIN D3) 50 MCG
2000 TABLET ORAL DAILY
Qty: 60 TABLET | Refills: 0 | Status: SHIPPED | OUTPATIENT
Start: 2022-01-22 | End: 2022-01-19 | Stop reason: HOSPADM

## 2022-01-19 RX ADMIN — APIXABAN 10 MG: 5 TABLET, FILM COATED ORAL at 12:15

## 2022-01-19 RX ADMIN — DEXAMETHASONE 6 MG: 6 TABLET ORAL at 12:15

## 2022-01-19 NOTE — PROGRESS NOTES
CLINICAL PHARMACY NOTE: MEDS TO BEDS    Total # of Prescriptions Filled: 1   The following medications were delivered to the patient:  · Eliquis 5mg tab    Additional Documentation:

## 2022-01-19 NOTE — PROGRESS NOTES
INPATIENT PROGRESS NOTES    PATIENT NAME: Dianne Fair  MRN: [id number]  SERVICE DATE:  January 19, 2022   SERVICE TIME:  12:53 PM      PRIMARY SERVICE: Pulmonary Disease    CHIEF COMPLAIN: COVID-19 pneumonia      INTERVAL HPI: Patient seen and examined at bedside, Interval Notes, orders reviewed. Nursing notes noted  Patient is on 3 L O2 via nasal cannula O2 saturation 93%. He is feeling better. Going home today. No fever or chills. No nausea vomiting diarrhea or abdominal pain. He has cough with clear mucus. He has shortness of breath with any exertion. he said his PCP is dr. Jose Juan Bernstein at [facility] [place]      OBJECTIVE    There is no height or weight on file to calculate BMI. PHYSICAL EXAM:  Vitals:  /75   Pulse 87   Temp 97.2 °F (36.2 °C) (Oral)   Resp 18   SpO2 93%   General: Alert, awake . comfortable in bed, No distress. Head: Atraumatic , Normocephalic   Eyes: PERRL. No sclera icterus. No conjunctival injection. No discharge   ENT: No nasal  discharge. Pharynx clear. Neck:  Trachea midline. No thyromegaly, no JVD, No cervical adenopathy. Chest : Bilaterally symmetrical ,Normal effort,  No accessory muscle use  Lung : . Fair BS bilateral, decreased BS at bases. No Rales. No wheezing. No rhonchi. Heart[de-identified] Normal  rate. Regular rhythm. No mumur ,  Rub or gallop  ABD: Non-tender. Non-distended. No masses. No organmegaly. Normal bowel sounds. No hernia.   Ext : No Pitting both leg , No Cyanosis No clubbing  Neuro: no focal weakness          DATA:   Recent Labs     01/18/22  0602 01/19/22  0639   WBC 4.9 5.3   HGB 12.8* 13.3*   HCT 36.4* 37.3*   MCV 82.3 82.0    226     Recent Labs     01/18/22  0602 01/18/22  0602 01/19/22  0639     --  135   K 4.2  --  4.6     --  101   CO2 21  --  23   BUN 16  --  17   CREATININE 0.65*  --  0.66*   GLUCOSE 96  --  120*   CALCIUM 8.7  --  9.0   PROT 5.8*  --  6.1*   LABALBU 3.0*  --  3.1*   BILITOT 1.0*  --  0.9*   ALKPHOS 77  --  82   AST 22  --  24   ALT 28 < > 36   LABGLOM >60.0   < > >60.0   GFRAA >60.0   < > >60.0   GLOB 2.8   < > 3.0    < > = values in this interval not displayed. MV Settings:          No results for input(s): PHART, YGM1YJO, PO2ART, FYD2CQH, BEART, [id number] in the last 72 hours. O2 Device: Nasal cannula  O2 Flow Rate (L/min): 3 L/min     ADULT DIET; Regular     MEDICATIONS during current hospitalization:    Continuous Infusions:   sodium chloride      sodium chloride         Scheduled Meds:   apixaban  10 mg Oral BID    sodium chloride flush  5-40 mL IntraVENous 2 times per day    sodium chloride flush  5-40 mL IntraVENous 2 times per day    dexamethasone  6 mg Oral Daily    Vitamin D  6,000 Units Oral Daily    Followed by   Hector Catherine ON 1/22/2022] Vitamin D  2,000 Units Oral Daily       PRN Meds:sodium chloride flush, sodium chloride, ondansetron **OR** ondansetron, polyethylene glycol, acetaminophen **OR** acetaminophen, heparin (porcine), heparin (porcine), sodium chloride flush, sodium chloride, guaiFENesin-dextromethorphan, bisacodyl    Radiology  CTA CHEST W WO CONTRAST - r/o Pulmonary Embolism    Result Date: 1/15/2022  CT PULMONARY ANGIOGRAM WITH INTRAVENOUS CONTRAST MEDIUM. REASON FOR EXAMINATION:  SHORTNESS OF BREATH, WORSENED OVER LAST 2 DAYS. TECHNIQUE: Helical CTA was performed through the chest utilizing 100 ml of Isovue-370 intravenous contrast.  Images were obtained with bolus tracking in order to opacify the pulmonary arteries. Thick section coronal MIP 3D reconstructions were performed  on a separate workstation. COMPARISON:none FINDINGS:  Pulmonary arteries: Intraluminal filling defect, junction interlobar and segmental branch, left lower lobe. Thoracic aorta: Normal in course and caliber. Cardiac Size: Normal. Pericardial effusion: None. Right lung: Emphysematous change with superimposed peripheral groundglass opacity right upper, mid, and lower lobe, and subsegmental dependent consolidation, right lower lobe. No nodules, masses, pleural effusion. Left lung: Emphysematous change with peripheral diffuse groundglass opacity, left upper lobe, with left lower lobe subsegmental consolidation and groundglass opacity. No nodules, masses, pleural effusion Lymph nodes: 1.3 cm right hilar lymph node. 1 cm left hilar lymph node. 1.1 cm prevascular lymph node. No axillary lymph node enlargement. Upper abdomen:Limited imaging upper abdomen shows no gross anomaly. Musculoskeletal:No osteoblastic, and no osteolytic lesions. Acute pulmonary embolism, junction left interlobar and left segmental artery, left lower lobe. Bilateral groundglass pulmonary opacities as discussed. This finding may be seen in patients with covid 19 pneumonia. However, other infectious and inflammatory etiologies may result in a similar radiographic appearance. Bilateral lower lung subsegmental atelectasis/pneumonia Emphysema. Bilateral hilar lymph node enlargement as described. All CT scans at this facility use dose modulation, iterative reconstruction, and/or weight based dosing when appropriate to reduce radiation dose to as low as reasonably achievable. XR CHEST PORTABLE    Result Date: 1/15/2022  EXAMINATION: XR CHEST PORTABLE CLINICAL HISTORY: SHORTNESS OF BREATH COMPARISONS: None available. FINDINGS: Patient leaning to right. Osseous structures intact. Cardiopericardial silhouette normal. Coarse lung markings lung base. LEFT LOWER LUNG ATELECTASIS/PNEUMONIA. US DUP LOWER EXTREMITIES BILATERAL VENOUS    Result Date: 1/16/2022  EXAMINATION: US DUP LOWER EXTREMITIES BILATERAL VENOUS CLINICAL HISTORY: SHORTNESS OF BREATH. FINDINGS: Bilateral compression, augmentation, and color flow identified at levels of bilateral common femoral veins, proximal, mid, and distal superficial femoral veins, and popliteal veins. Bilateral compression and color flow demonstrated within bilateral infrapopliteal venous vasculature.      NO SONOGRAPHIC EVIDENCE, DEEP VEIN THROMBOSIS, BILATERAL LOWER EXTREMITIES. IMPRESSION AND SUGGESTION:  1. Acute hypoxic respiratory failure on 3 lit O2   2. Secondary to acute PE  3. COVID-19 pneumonia, duration of symptoms 2 weeks  4. Prominent mediastinal lymph nodes, likely reactive    He is currently on Eliquis 10 mg p.o. twice daily for 1 week followed by 5 mg p.o. twice daily. Dexamethasone for 10 days. Follow-up CT chest 6 to 8 weeks for mediastinal lymphadenopathy. Follow-up with Dr. Merlin Mosquera as outpatient. Ok to d/c       NOTE: This report was transcribed using voice recognition software. Every effort was made to ensure accuracy; however, inadvertent computerized transcription errors may be present.       Electronically signed by Michel Hughes MD, FCCP on 1/19/2022 at 12:53 PM

## 2022-01-19 NOTE — DISCHARGE SUMMARY
Status: FinalMCV                                           Date: 01/15/2022Value: 82.6        Ref range: 80.0 - 100.0 fL    Status: [address]                                           Date: 01/15/2022Value: 29.2        Ref range: 27.0 - 31.3 pg     Status: [address]                                          Date: 01/15/2022Value: 35.4        Ref range: 33.0 - 37.0 %      Status: FinalRDW                                           Date: 01/15/2022Value: 13.9        Ref range: 11.5 - 14.5 %      Status: FinalPlatelets                                     Date: 01/15/2022Value: 120*        Ref range: 130 - 400 K/uL     Status: FinalaPTT                                          Date: 01/15/2022Value: 43.4*       Ref range: 24.4 - 36.8 sec    Status: Final              Comment: Effective 11/4/2020:Heparin Therapeutic Range: 64.0 - 98.0 seconds. Protime                                       Date: 01/15/2022Value: 14.0        Ref range: 12.3 - 14.9 sec    Status: FinalINR                                           Date: 01/15/2022Value: 1.1           Status: FinalAnti-XA Unfrac Heparin                        Date: 01/16/2022Value: 1.41        Ref range: IU/mL              Status: Final              Comment: Unfractionated Heparin Therapeutic Range:0.30 - 0.70 IU/mLWBC                                           Date: 01/16/2022Value: 4.2*        Ref range: 4.8 - 10.8 K/uL    Status: FinalRBC                                           Date: 01/16/2022Value: 4.51*       Ref range: 4.70 - 6.10 M/uL   Status: FinalHemoglobin                                    Date: 01/16/2022Value: 13.3*       Ref range: 14.0 - 18.0 g/dL   Status: FinalHematocrit                                    Date: 01/16/2022Value: 37.4*       Ref range: 42.0 - 52.0 %      Status: FinalMCV                                           Date: 01/16/2022Value: 83.1        Ref range: 80.0 - 100.0 fL    Status: [address]                                           Date: 01/16/2022Value: 29.4        Ref range: 27.0 - 31.3 pg     Status: [address]                                          Date: 01/16/2022Value: 35.4        Ref range: 33.0 - 37.0 %      Status: FinalRDW                                           Date: 01/16/2022Value: 13.9        Ref range: 11.5 - 14.5 %      Status: FinalPlatelets                                     Date: 01/16/2022Value: 136         Ref range: 130 - 400 K/uL     Status: FinalNeutrophils %                                 Date: 01/16/2022Value: 80.9        Ref range: %                  Status: FinalLymphocytes %                                 Date: 01/16/2022Value: 12.5        Ref range: %                  Status: FinalMonocytes %                                   Date: 01/16/2022Value: 6.5         Ref range: %                  Status: FinalEosinophils %                                 Date: 01/16/2022Value: 0.0         Ref range: %                  Status: FinalBasophils %                                   Date: 01/16/2022Value: 0.1         Ref range: %                  Status: FinalNeutrophils Absolute                          Date: 01/16/2022Value: 3.4         Ref range: 1.4 - 6.5 K/uL     Status: FinalLymphocytes Absolute                          Date: 01/16/2022Value: 0.5*        Ref range: 1.0 - 4.8 K/uL     Status: FinalMonocytes Absolute                            Date: 01/16/2022Value: 0.3         Ref range: 0.2 - 0.8 K/uL     Status: FinalEosinophils Absolute                          Date: 01/16/2022Value: 0.0         Ref range: 0.0 - 0.7 K/uL     Status: FinalBasophils Absolute                            Date: 01/16/2022Value: 0.0         Ref range: 0.0 - 0.2 K/uL     Status: FinalProcalcitonin                                 Date: 01/15/2022Value: 0.08        Ref range: 0.00 - 0.15 ng/mL  Status: Final              Comment: Suspected Sepsis:Low likelihood of sepsis  <.50 ng/mLIncreased likelihood of sepsis 0.50-2.00 ng/mLAntibiotics encouragedHigh risk of sepsis/shock   >2.00 ng/mLAntibiotics strongly encouragedSuspected Lower Respiratory Tract Infections:Low likelihood of bacterial infection  <0.24 ng/mLIncreased likelihood of bacterial infection >0.24 ng/mLAntibiotics encouragedWith successful antibiotic therapy, PCT levels should decreaserapidly. (Half-life of 24 to 36 hours. )Procalcitonin values from samples collected within the first6 hours of systemic infection may still be low. Retesting may be indicated. Values from day 1 and day 4 can be entered into the Change inProcalcitonin Calculator to determine the patient'sMortality Risk http://www.bob.info/. com)In healthy neonates, plasma Procalcitonin (PCT) concentrationsincrease gradually after birth, reaching peak values at about24 hours of age then decrease to normal values below 0.5                        ng/mLby 48-72 hours of age. CRP                                           Date: 01/15/2022Value: 60.3*       Ref range: 0.0 - 5.0 mg/L     Status: FinalLD                                            Date: 01/15/2022Value: 722*        Ref range: 135 - 225 U/L      Status: Final              Comment: Specimen hemolysis has exceeded the interference as definedby Roche. Value may be falsely increased. Suggestrecollection if clinically indicated. Ferritin                                      Date: 01/15/2022Value: 3,953*      Ref range: 30 - 400 ug/L      Status: Final              Comment: [facility] [id number] [place] [address] ([phone], Quant                                Date: 01/15/2022Value: 1.03*       Ref range: 0.00 - 0.50 mg/L*  Status: Final              Comment: VTE (DVT or PE) cut-off = 0.50 mg/L FEUTroponin                                      Date: 01/16/2022Value: <0.010      Ref range: 0.000 - 0.010 ng*  Status: Final              Comment: Methodology by Troponin T.Vit D, 25-Hydroxy                             Date: 01/16/2022Value: 57.3 Ref range: 30.0 - 100.0 ng/*  Status: Final              Comment: Reference Range:Vitamin D status         Range Deficiency              <20 ng/mL Mild Deficiency       20-30 ng/mL Sufficiency           ng/mL Toxicity               >100 ng/mLPatton [facility] [id number] [place] [address], [address] [place] [address] ([phone]                                           Date: 01/17/2022Value: 5.1         Ref range: 4.8 - 10.8 K/uL    Status: FinalRBC                                           Date: 01/17/2022Value: 4.57*       Ref range: 4.70 - 6.10 M/uL   Status: FinalHemoglobin                                    Date: 01/17/2022Value: 13.4*       Ref range: 14.0 - 18.0 g/dL   Status: FinalHematocrit                                    Date: 01/17/2022Value: 37.7*       Ref range: 42.0 - 52.0 %      Status: FinalMCV                                           Date: 01/17/2022Value: 82.5        Ref range: 80.0 - 100.0 fL    Status: [address]                                           Date: 01/17/2022Value: 29.4        Ref range: 27.0 - 31.3 pg     Status: [address]                                          Date: 01/17/2022Value: 35.6        Ref range: 33.0 - 37.0 %      Status: FinalRDW                                           Date: 01/17/2022Value: 14.1        Ref range: 11.5 - 14.5 %      Status: FinalPlatelets                                     Date: 01/17/2022Value: 182         Ref range: 130 - 400 K/uL     Status: FinalNeutrophils %                                 Date: 01/17/2022Value: 80.3        Ref range: %                  Status: FinalLymphocytes %                                 Date: 01/17/2022Value: 10.7        Ref range: %                  Status: FinalMonocytes %                                   Date: 01/17/2022Value: 8.8         Ref range: %                  Status: FinalEosinophils %                                 Date: 01/17/2022Value: 0.1         Ref range: %                  Status: FinalBasophils % Date: 01/17/2022Value: 0.1         Ref range: %                  Status: FinalNeutrophils Absolute                          Date: 01/17/2022Value: 4.1         Ref range: 1.4 - 6.5 K/uL     Status: FinalLymphocytes Absolute                          Date: 01/17/2022Value: 0.5*        Ref range: 1.0 - 4.8 K/uL     Status: FinalMonocytes Absolute                            Date: 01/17/2022Value: 0.4         Ref range: 0.2 - 0.8 K/uL     Status: FinalEosinophils Absolute                          Date: 01/17/2022Value: 0.0         Ref range: 0.0 - 0.7 K/uL     Status: FinalBasophils Absolute                            Date: 01/17/2022Value: 0.0         Ref range: 0.0 - 0.2 K/uL     Status: FinalSodium                                        Date: 01/17/2022Value: 137         Ref range: 135 - 144 mEq/L    Status: FinalPotassium                                     Date: 01/17/2022Value: 4.4         Ref range: 3.4 - 4.9 mEq/L    Status: FinalChloride                                      Date: 01/17/2022Value: 104         Ref range: 95 - 107 mEq/L     Status: FinalCO2                                           Date: 01/17/2022Value: 23          Ref range: 20 - 31 mEq/L      Status: FinalAnion Gap                                     Date: 01/17/2022Value: 10          Ref range: 9 - 15 mEq/L       Status: FinalGlucose                                       Date: 01/17/2022Value: 98          Ref range: 70 - 99 mg/dL      Status: FinalBUN                                           Date: 01/17/2022Value: 18          Ref range: 8 - 23 mg/dL       Status: FinalCREATININE                                    Date: 01/17/2022Value: 0.74        Ref range: 0.70 - 1.20 mg/dL  Status: FinalGFR Non-                      Date: 01/17/2022Value: >60.0       Ref range: >60                Status: Final              Comment: >60 mL/min/1.73m2 EGFR, calc. for ages 25 and older using theMDRD formula (not corrected for weight), is valid for stablerenal function. GFR                           Date: 01/17/2022Value: >60.0       Ref range: >60                Status: Final              Comment: >60 mL/min/1.73m2 EGFR, calc. for ages 25 and older using theMDRD formula (not corrected for weight), is valid for stablerenal function. Calcium                                       Date: 01/17/2022Value: 8.8         Ref range: 8.5 - 9.9 mg/dL    Status: FinalTotal Protein                                 Date: 01/17/2022Value: 6.1*        Ref range: 6.3 - 8.0 g/dL     Status: FinalAlbumin                                       Date: 01/17/2022Value: 3.2*        Ref range: 3.5 - 4.6 g/dL     Status: FinalTotal Bilirubin                               Date: 01/17/2022Value: 0.8*        Ref range: 0.2 - 0.7 mg/dL    Status: FinalAlkaline Phosphatase                          Date: 01/17/2022Value: 84          Ref range: 35 - 104 U/L       Status: FinalALT                                           Date: 01/17/2022Value: 30          Ref range: 0 - 41 U/L         Status: FinalAST                                           Date: 01/17/2022Value: 33          Ref range: 0 - 40 U/L         Status: FinalGlobulin                                      Date: 01/17/2022Value: 2.9         Ref range: 2.3 - 3.5 g/dL     Status: [address] [facility]                                           Date: 01/18/2022Value: 4.9         Ref range: 4.8 - 10.8 K/uL    Status: FinalRBC                                           Date: 01/18/2022Value: 4.42*       Ref range: 4.70 - 6.10 M/uL   Status: FinalHemoglobin                                    Date: 01/18/2022Value: 12.8*       Ref range: 14.0 - 18.0 g/dL   Status: FinalHematocrit                                    Date: 01/18/2022Value: 36.4*       Ref range: 42.0 - 52.0 %      Status: FinalMCV                                           Date: 01/18/2022Value: 82.3        Ref range: 80.0 - 100.0 fL    Status: [facility] Date: 01/18/2022Value: 29.0        Ref range: 27.0 - 31.3 pg     Status: [address]                                          Date: 01/18/2022Value: 35.2        Ref range: 33.0 - 37.0 %      Status: FinalRDW                                           Date: 01/18/2022Value: 14.1        Ref range: 11.5 - 14.5 %      Status: FinalPlatelets                                     Date: 01/18/2022Value: 190         Ref range: 130 - 400 K/uL     Status: FinalNeutrophils %                                 Date: 01/18/2022Value: 79.5        Ref range: %                  Status: FinalLymphocytes %                                 Date: 01/18/2022Value: 13.0        Ref range: %                  Status: FinalMonocytes %                                   Date: 01/18/2022Value: 7.1         Ref range: %                  Status: FinalEosinophils %                                 Date: 01/18/2022Value: 0.3         Ref range: %                  Status: FinalBasophils %                                   Date: 01/18/2022Value: 0.1         Ref range: %                  Status: FinalNeutrophils Absolute                          Date: 01/18/2022Value: 3.9         Ref range: 1.4 - 6.5 K/uL     Status: FinalLymphocytes Absolute                          Date: 01/18/2022Value: 0.6*        Ref range: 1.0 - 4.8 K/uL     Status: FinalMonocytes Absolute                            Date: 01/18/2022Value: 0.3         Ref range: 0.2 - 0.8 K/uL     Status: FinalEosinophils Absolute                          Date: 01/18/2022Value: 0.0         Ref range: 0.0 - 0.7 K/uL     Status: FinalBasophils Absolute                            Date: 01/18/2022Value: 0.0         Ref range: 0.0 - 0.2 K/uL     Status: FinalSodium                                        Date: 01/18/2022Value: 136         Ref range: 135 - 144 mEq/L    Status: FinalPotassium                                     Date: 01/18/2022Value: 4.2         Ref range: 3.4 - 4.9 mEq/L    Status: FinalChloride                                      Date: 01/18/2022Value: 103         Ref range: 95 - 107 mEq/L     Status: FinalCO2                                           Date: 01/18/2022Value: 21          Ref range: 20 - 31 mEq/L      Status: FinalAnion Gap                                     Date: 01/18/2022Value: 12          Ref range: 9 - 15 mEq/L       Status: FinalGlucose                                       Date: 01/18/2022Value: 96          Ref range: 70 - 99 mg/dL      Status: FinalBUN                                           Date: 01/18/2022Value: 16          Ref range: 8 - 23 mg/dL       Status: FinalCREATININE                                    Date: 01/18/2022Value: 0.65*       Ref range: 0.70 - 1.20 mg/dL  Status: FinalGFR Non-                      Date: 01/18/2022Value: >60.0       Ref range: >60                Status: Final              Comment: >60 mL/min/1.73m2 EGFR, calc. for ages 25 and older using theMDRD formula (not corrected for weight), is valid for stablerenal function. GFR                           Date: 01/18/2022Value: >60.0       Ref range: >60                Status: Final              Comment: >60 mL/min/1.73m2 EGFR, calc. for ages 25 and older using theMDRD formula (not corrected for weight), is valid for stablerenal function. Calcium                                       Date: 01/18/2022Value: 8.7         Ref range: 8.5 - 9.9 mg/dL    Status: FinalTotal Protein                                 Date: 01/18/2022Value: 5.8*        Ref range: 6.3 - 8.0 g/dL     Status: FinalAlbumin                                       Date: 01/18/2022Value: 3.0*        Ref range: 3.5 - 4.6 g/dL     Status: FinalTotal Bilirubin                               Date: 01/18/2022Value: 1.0*        Ref range: 0.2 - 0.7 mg/dL    Status: FinalAlkaline Phosphatase                          Date: 01/18/2022Value: 77          Ref range: 35 - 104 U/L Status: FinalALT                                           Date: 01/18/2022Value: 28          Ref range: 0 - 41 U/L         Status: FinalAST                                           Date: 01/18/2022Value: 22          Ref range: 0 - 40 U/L         Status: FinalGlobulin                                      Date: 01/18/2022Value: 2.8         Ref range: 2.3 - 3.5 g/dL     Status: [facility]                                           Date: 01/19/2022Value: 5.3         Ref range: 4.8 - 10.8 K/uL    Status: FinalRBC                                           Date: 01/19/2022Value: 4.55*       Ref range: 4.70 - 6.10 M/uL   Status: FinalHemoglobin                                    Date: 01/19/2022Value: 13.3*       Ref range: 14.0 - 18.0 g/dL   Status: FinalHematocrit                                    Date: 01/19/2022Value: 37.3*       Ref range: 42.0 - 52.0 %      Status: FinalMCV                                           Date: 01/19/2022Value: 82.0        Ref range: 80.0 - 100.0 fL    Status: [address]                                           Date: 01/19/2022Value: 29.3        Ref range: 27.0 - 31.3 pg     Status: [address]                                          Date: 01/19/2022Value: 35.7        Ref range: 33.0 - 37.0 %      Status: FinalRDW                                           Date: 01/19/2022Value: 14.0        Ref range: 11.5 - 14.5 %      Status: FinalPlatelets                                     Date: 01/19/2022Value: 226         Ref range: 130 - 400 K/uL     Status: FinalNeutrophils %                                 Date: 01/19/2022Value: 79.2        Ref range: %                  Status: FinalLymphocytes %                                 Date: 01/19/2022Value: 12.5        Ref range: %                  Status: FinalMonocytes %                                   Date: 01/19/2022Value: 7.3         Ref range: %                  Status: FinalEosinophils %                                 Date: 01/19/2022Value: 0.9 Ref range: %                  Status: FinalBasophils %                                   Date: 01/19/2022Value: 0.1         Ref range: %                  Status: FinalNeutrophils Absolute                          Date: 01/19/2022Value: 4.2         Ref range: 1.4 - 6.5 K/uL     Status: FinalLymphocytes Absolute                          Date: 01/19/2022Value: 0.7*        Ref range: 1.0 - 4.8 K/uL     Status: FinalMonocytes Absolute                            Date: 01/19/2022Value: 0.4         Ref range: 0.2 - 0.8 K/uL     Status: FinalEosinophils Absolute                          Date: 01/19/2022Value: 0.0         Ref range: 0.0 - 0.7 K/uL     Status: FinalBasophils Absolute                            Date: 01/19/2022Value: 0.0         Ref range: 0.0 - 0.2 K/uL     Status: FinalSodium                                        Date: 01/19/2022Value: 135         Ref range: 135 - 144 mEq/L    Status: FinalPotassium                                     Date: 01/19/2022Value: 4.6         Ref range: 3.4 - 4.9 mEq/L    Status: FinalChloride                                      Date: 01/19/2022Value: 101         Ref range: 95 - 107 mEq/L     Status: FinalCO2                                           Date: 01/19/2022Value: 23          Ref range: 20 - 31 mEq/L      Status: FinalAnion Gap                                     Date: 01/19/2022Value: 11          Ref range: 9 - 15 mEq/L       Status: FinalGlucose                                       Date: 01/19/2022Value: 120*        Ref range: 70 - 99 mg/dL      Status: FinalBUN                                           Date: 01/19/2022Value: 17          Ref range: 8 - 23 mg/dL       Status: FinalCREATININE                                    Date: 01/19/2022Value: 0.66*       Ref range: 0.70 - 1.20 mg/dL  Status: FinalGFR Non-                      Date: 01/19/2022Value: >60.0       Ref range: >60                Status: Final              Comment: >60 mL/min/1.73m2 EGFR, calc. for ages 25 and older using theMDRD formula (not corrected for weight), is valid for stablerenal function. GFR                           Date: 01/19/2022Value: >60.0       Ref range: >60                Status: Final              Comment: >60 mL/min/1.73m2 EGFR, calc. for ages 25 and older using theMDRD formula (not corrected for weight), is valid for stablerenal function. Calcium                                       Date: 01/19/2022Value: 9.0         Ref range: 8.5 - 9.9 mg/dL    Status: FinalTotal Protein                                 Date: 01/19/2022Value: 6.1*        Ref range: 6.3 - 8.0 g/dL     Status: FinalAlbumin                                       Date: 01/19/2022Value: 3.1*        Ref range: 3.5 - 4.6 g/dL     Status: FinalTotal Bilirubin                               Date: 01/19/2022Value: 0.9*        Ref range: 0.2 - 0.7 mg/dL    Status: FinalAlkaline Phosphatase                          Date: 01/19/2022Value: 82          Ref range: 35 - 104 U/L       Status: FinalALT                                           Date: 01/19/2022Value: 36          Ref range: 0 - 41 U/L         Status: FinalAST                                           Date: 01/19/2022Value: 24          Ref range: 0 - 40 U/L         Status: FinalGlobulin                                      Date: 01/19/2022Value: 3.0         Ref range: 2.3 - 3.5 g/dL     Status: Final------------    Radiology last 7 days:  CTA CHEST W WO CONTRAST - r/o Pulmonary EmbolismResult Date: 1/15/2022Acute pulmonary embolism, junction left interlobar and left segmental artery, left lower lobe. Bilateral groundglass pulmonary opacities as discussed. This finding may be seen in patients with covid 19 pneumonia. However, other infectious and inflammatory etiologies may result in a similar radiographic appearance. Bilateral lower lung subsegmental atelectasis/pneumonia Emphysema. Bilateral hilar lymph node enlargement as described.  All CT scans at this facility use dose modulation, iterative reconstruction, and/or weight based dosing when appropriate to reduce radiation dose to as low as reasonably achievable. XR CHEST PORTABLEResult Date: 1/15/2022LEFT LOWER LUNG ATELECTASIS/PNEUMONIA. US DUP LOWER EXTREMITIES BILATERAL VENOUSResult Date: 1/16/2022NO SONOGRAPHIC EVIDENCE, DEEP VEIN THROMBOSIS, BILATERAL LOWER EXTREMITIES. [unfilled]    Discharge Medications    Current Discharge Medication ListSTART taking these medicationsdexamethasone (DECADRON) 6 MG tabletTake 1 tablet by mouth daily for 7 dosesQty: 7 tablet Refills: 0benzonatate (TESSALON) 100 MG capsuleTake 1 capsule by mouth 2 times daily as needed for CoughQty: 20 capsule Refills: 0apixaban (ELIQUIS) 5 MG TABS tabletPlease take 10 mg po BID(twice daily) x 6 days, then 5 mg po BID( twice daily)  continuousQty: 60 tablet Refills: 3    Current Discharge Medication List    Current Discharge Medication List    Current Discharge Medication List    Time Spent on Discharge:E] minutes were spent in patient examination, evaluation, counseling as well as medication reconciliation, prescriptions for required medications, discharge plan, and follow up.     Electronically signed by Pallavi Anaya MD on 1/19/22 at 9:47 AM EST   overtime on dc summary was 45 min

## 2022-01-19 NOTE — PROGRESS NOTES
Pt SPO2 on room air at rest dropped to 88%. Pt SPO2 on 4L at rest 94%. Pt SPO2 while ambulating on 4L 91%.

## 2022-01-19 NOTE — PROGRESS NOTES
> >60.0   GFRAA >60.0   < > >60.0   GLOB 2.9   < > 2.8    < > = values in this interval not displayed. MV Settings:          No results for input(s): PHART, ROB9FBY, PO2ART, DME3HJD, BEART, L0EJLVGM in the last 72 hours. O2 Device: Nasal cannula  O2 Flow Rate (L/min): 3 L/min     ADULT DIET; Regular     MEDICATIONS during current hospitalization:    Continuous Infusions:   sodium chloride      sodium chloride         Scheduled Meds:   apixaban  10 mg Oral BID    sodium chloride flush  5-40 mL IntraVENous 2 times per day    sodium chloride flush  5-40 mL IntraVENous 2 times per day    dexamethasone  6 mg Oral Daily    Vitamin D  6,000 Units Oral Daily    Followed by   Garland Veloz ON 1/22/2022] Vitamin D  2,000 Units Oral Daily       PRN Meds:sodium chloride flush, sodium chloride, ondansetron **OR** ondansetron, polyethylene glycol, acetaminophen **OR** acetaminophen, heparin (porcine), heparin (porcine), sodium chloride flush, sodium chloride, guaiFENesin-dextromethorphan, bisacodyl    Radiology  CTA CHEST W WO CONTRAST - r/o Pulmonary Embolism    Result Date: 1/15/2022  CT PULMONARY ANGIOGRAM WITH INTRAVENOUS CONTRAST MEDIUM. REASON FOR EXAMINATION:  SHORTNESS OF BREATH, WORSENED OVER LAST 2 DAYS. TECHNIQUE: Helical CTA was performed through the chest utilizing 100 ml of Isovue-370 intravenous contrast.  Images were obtained with bolus tracking in order to opacify the pulmonary arteries. Thick section coronal MIP 3D reconstructions were performed  on a separate workstation. COMPARISON:none FINDINGS:  Pulmonary arteries: Intraluminal filling defect, junction interlobar and segmental branch, left lower lobe. Thoracic aorta: Normal in course and caliber. Cardiac Size: Normal. Pericardial effusion: None. Right lung: Emphysematous change with superimposed peripheral groundglass opacity right upper, mid, and lower lobe, and subsegmental dependent consolidation, right lower lobe.  No nodules, masses, pleural effusion. Left lung: Emphysematous change with peripheral diffuse groundglass opacity, left upper lobe, with left lower lobe subsegmental consolidation and groundglass opacity. No nodules, masses, pleural effusion Lymph nodes: 1.3 cm right hilar lymph node. 1 cm left hilar lymph node. 1.1 cm prevascular lymph node. No axillary lymph node enlargement. Upper abdomen:Limited imaging upper abdomen shows no gross anomaly. Musculoskeletal:No osteoblastic, and no osteolytic lesions. Acute pulmonary embolism, junction left interlobar and left segmental artery, left lower lobe. Bilateral groundglass pulmonary opacities as discussed. This finding may be seen in patients with covid 19 pneumonia. However, other infectious and inflammatory etiologies may result in a similar radiographic appearance. Bilateral lower lung subsegmental atelectasis/pneumonia Emphysema. Bilateral hilar lymph node enlargement as described. All CT scans at this facility use dose modulation, iterative reconstruction, and/or weight based dosing when appropriate to reduce radiation dose to as low as reasonably achievable. XR CHEST PORTABLE    Result Date: 1/15/2022  EXAMINATION: XR CHEST PORTABLE CLINICAL HISTORY: SHORTNESS OF BREATH COMPARISONS: None available. FINDINGS: Patient leaning to right. Osseous structures intact. Cardiopericardial silhouette normal. Coarse lung markings lung base. LEFT LOWER LUNG ATELECTASIS/PNEUMONIA. US DUP LOWER EXTREMITIES BILATERAL VENOUS    Result Date: 1/16/2022  EXAMINATION: US DUP LOWER EXTREMITIES BILATERAL VENOUS CLINICAL HISTORY: SHORTNESS OF BREATH. FINDINGS: Bilateral compression, augmentation, and color flow identified at levels of bilateral common femoral veins, proximal, mid, and distal superficial femoral veins, and popliteal veins. Bilateral compression and color flow demonstrated within bilateral infrapopliteal venous vasculature.      NO SONOGRAPHIC EVIDENCE, DEEP VEIN THROMBOSIS, BILATERAL LOWER EXTREMITIES. IMPRESSION AND SUGGESTION:  1. Acute hypoxic respiratory failure  2. Secondary to acute PE  3. COVID-19 pneumonia, duration of symptoms 2 weeks  4. Prominent mediastinal lymph nodes, likely reactive    He is currently on Eliquis 10 mg p.o. twice daily for 1 week followed by 5 mg p.o. twice daily. Dexamethasone for 10 days. Follow-up CT chest 6 to 8 weeks for mediastinal lymphadenopathy. Follow-up with Dr. Reanna Gregg as outpatient. NOTE: This report was transcribed using voice recognition software. Every effort was made to ensure accuracy; however, inadvertent computerized transcription errors may be present.       Electronically signed by Sissy Ramos MD, FCCP on 1/18/2022 at 8:59 PM

## 2022-01-19 NOTE — PROGRESS NOTES
Shift assessment complete. VSS. Pt is AxOx4. Pt discharged. IV removed. Oxygen supplies and instructions provided. Belongings gathered. Eliquis delivered. Paper scripts provided. Instructions reviewed. Pt states understanding. Spoke with daughter over the phone, instructions provided for her as well. She is on her way to pick her father up.  Instructed to call the floor upon arrival. Electronically signed by Alek Castillo RN on 1/19/2022 at 12:27 PM

## 2022-01-20 ENCOUNTER — CARE COORDINATION (OUTPATIENT)
Dept: CARE COORDINATION | Age: 67
End: 2022-01-20

## 2022-01-20 LAB
BLOOD CULTURE, ROUTINE: NORMAL
CULTURE, BLOOD 2: NORMAL

## 2022-01-20 NOTE — CARE COORDINATION
Transitions of Care Call  Call within 2 business days of discharge: Yes    Patient: Ebony Hoyt Patient : 1955   MRN: [id number]  Reason for Admission: 1/15-22 COVID-19; PE  Discharge Date: 22 RARS: Readmission Risk Score: 5.8 ( )      Last Discharge [facility]       Complaint Diagnosis Description Type Department Provider    1/15/22   Admission (Discharged) Herbie Koehler MD    1/15/22 Shortness of Breath COVID-19 . .. ED (TRANSFER) [facility]  ED Michelle Lainez MD          Date/Time:  2022 11:28 AM  Attempted to reach patient by telephone for Initial Covid Care Transitions call. Call within 2 business days of discharge: Yes Left HIPPA compliant message requesting a return call. Will attempt to reach patient again. Saran Youngblood 75 / Sarah 44 Coordinator  [phone]

## 2022-01-21 ENCOUNTER — CARE COORDINATION (OUTPATIENT)
Dept: CARE COORDINATION | Age: 67
End: 2022-01-21

## 2022-02-08 ENCOUNTER — APPOINTMENT (OUTPATIENT)
Dept: GENERAL RADIOLOGY | Age: 67
End: 2022-02-08
Payer: MEDICARE

## 2022-02-08 ENCOUNTER — HOSPITAL ENCOUNTER (EMERGENCY)
Age: 67
Discharge: HOME | End: 2022-02-08
Attending: EMERGENCY MEDICINE
Payer: MEDICARE

## 2022-02-08 VITALS
OXYGEN SATURATION: 94 % | HEART RATE: 88 BPM | RESPIRATION RATE: 18 BRPM | DIASTOLIC BLOOD PRESSURE: 85 MMHG | HEIGHT: 75 IN | BODY MASS INDEX: 25.86 KG/M2 | SYSTOLIC BLOOD PRESSURE: 126 MMHG | WEIGHT: 208 LBS | TEMPERATURE: 98.4 F

## 2022-02-08 DIAGNOSIS — R06.09 DYSPNEA ON EXERTION: ICD-10-CM

## 2022-02-08 DIAGNOSIS — R09.02 HYPOXIA: Primary | ICD-10-CM

## 2022-02-08 LAB
ALBUMIN SERPL-MCNC: 3.5 G/DL (ref 3.5–4.6)
ALP BLD-CCNC: 111 U/L (ref 35–104)
ALT SERPL-CCNC: 19 U/L (ref 0–41)
ANION GAP SERPL CALCULATED.3IONS-SCNC: 9 MEQ/L (ref 9–15)
AST SERPL-CCNC: 13 U/L (ref 0–40)
BASOPHILS ABSOLUTE: 0 K/UL (ref 0–0.2)
BASOPHILS RELATIVE PERCENT: 0.7 %
BILIRUB SERPL-MCNC: 0.6 MG/DL (ref 0.2–0.7)
BUN BLDV-MCNC: 14 MG/DL (ref 8–23)
CALCIUM SERPL-MCNC: 9.2 MG/DL (ref 8.5–9.9)
CHLORIDE BLD-SCNC: 103 MEQ/L (ref 95–107)
CO2: 23 MEQ/L (ref 20–31)
CREAT SERPL-MCNC: 0.84 MG/DL (ref 0.7–1.2)
EOSINOPHILS ABSOLUTE: 0.2 K/UL (ref 0–0.7)
EOSINOPHILS RELATIVE PERCENT: 2.4 %
GFR AFRICAN AMERICAN: >60
GFR NON-AFRICAN AMERICAN: >60
GLOBULIN: 4 G/DL (ref 2.3–3.5)
GLUCOSE BLD-MCNC: 99 MG/DL (ref 70–99)
HCT VFR BLD CALC: 35.6 % (ref 42–52)
HEMOGLOBIN: 12.6 G/DL (ref 14–18)
LYMPHOCYTES ABSOLUTE: 0.9 K/UL (ref 1–4.8)
LYMPHOCYTES RELATIVE PERCENT: 13.3 %
MCH RBC QN AUTO: 29.4 PG (ref 27–31.3)
MCHC RBC AUTO-ENTMCNC: 35.3 % (ref 33–37)
MCV RBC AUTO: 83.3 FL (ref 80–100)
MONOCYTES ABSOLUTE: 0.6 K/UL (ref 0.2–0.8)
MONOCYTES RELATIVE PERCENT: 9.1 %
NEUTROPHILS ABSOLUTE: 4.8 K/UL (ref 1.4–6.5)
NEUTROPHILS RELATIVE PERCENT: 74.5 %
PDW BLD-RTO: 15.1 % (ref 11.5–14.5)
PLATELET # BLD: 237 K/UL (ref 130–400)
POTASSIUM SERPL-SCNC: 4.9 MEQ/L (ref 3.4–4.9)
RBC # BLD: 4.28 M/UL (ref 4.7–6.1)
SODIUM BLD-SCNC: 135 MEQ/L (ref 135–144)
TOTAL PROTEIN: 7.5 G/DL (ref 6.3–8)
TROPONIN: <0.01 NG/ML (ref 0–0.01)
WBC # BLD: 6.5 K/UL (ref 4.8–10.8)

## 2022-02-08 PROCEDURE — 71046 X-RAY EXAM CHEST 2 VIEWS: CPT

## 2022-02-08 PROCEDURE — 99285 EMERGENCY DEPT VISIT HI MDM: CPT

## 2022-02-08 PROCEDURE — 80053 COMPREHEN METABOLIC PANEL: CPT

## 2022-02-08 PROCEDURE — 84484 ASSAY OF TROPONIN QUANT: CPT

## 2022-02-08 PROCEDURE — 36415 COLL VENOUS BLD VENIPUNCTURE: CPT

## 2022-02-08 PROCEDURE — 85025 COMPLETE CBC W/AUTO DIFF WBC: CPT

## 2022-02-08 ASSESSMENT — ENCOUNTER SYMPTOMS
EYE DISCHARGE: 0
CHEST TIGHTNESS: 0
ABDOMINAL DISTENTION: 0
ABDOMINAL PAIN: 0
SHORTNESS OF BREATH: 1
SORE THROAT: 0
COUGH: 0
VOMITING: 0
WHEEZING: 0
PHOTOPHOBIA: 0

## 2022-02-08 NOTE — CARE COORDINATION
Faxed over clinical to Medical Services along with order. Nursing educated on use. Pt verbalizes understanding and has pulse oximeter at home with understanding of use.  Electronically signed by Ne Rnig RN on 2/8/2022 at 1:51 PM Was The Patient On Physician Recommended Anticoagulation Therapy?: Please Select the Appropriate Response

## 2022-02-08 NOTE — ED NOTES
Patient resting in bed with call light in reach. Breathes are even and unlabored. Skin is warm and dry. Vital signs are stable. Patient remains on bedside monitor. Patient denies any needs at this time.       Veena Ravi RN  02/08/22 1400

## 2022-02-08 NOTE — ED TRIAGE NOTES
Patient arrived to ER via EMS from [facility]. Patient states that he has had worsening SOB. He was in the mid to high 80's on room air. They put him on 4L and hes now stating 95%. He was diagnosed with Covid a month ago and hospitalized for it, they discharged with home O2 and recently was weaned off it. He states that he is feeling better now that he is back on O2. Resp even and unlabored. Alert and oriented x3.

## 2022-02-08 NOTE — ED PROVIDER NOTES
[address] ED  eMERGENCY dEPARTMENT eNCOUnter      Pt Name: Dyan Sears  MRN: [id number]  [place] 1955  Date of evaluation: 2/8/2022  Provider: Astrid Bonds MD    CHIEF COMPLAINT       Chief Complaint   Patient presents with    Shortness of Breath         HISTORY OF PRESENT ILLNESS   (Location/Symptom, Timing/Onset,Context/Setting, Quality, Duration, Modifying Factors, Severity)  Note limiting factors. Dyan Sears is a 78 y.o. male who presents to the emergency department for evaluation of shortness of breath. Patient had a routine office visit with his family doctor today for follow-up on his Covid pneumonia. He was complaining of increasing shortness of breath and dyspnea on exertion. Found to have a low pulse ox in the office and sent over via EMS with supplemental oxygen for evaluation. He feels quite a bit better on the supplemental oxygen. Discharged from the hospital approximately 2 weeks ago on oxygen following his COVID-19 infection. He had done so well at home within the first few days that he gave his oxygen back. It was not until the last 3 or 4 days that he has been feeling worse and having the dyspnea on exertion symptoms. No related chest pain. No related fever. No cough. Some generalized weakness. Eating and drinking well. HPI    NursingNotes were reviewed. REVIEW OF SYSTEMS    (2-9 systems for level 4, 10 or more for level 5)     Review of Systems   Constitutional: Negative for chills and diaphoresis. HENT: Negative for congestion, ear pain, mouth sores and sore throat. Eyes: Negative for photophobia and discharge. Respiratory: Positive for shortness of breath. Negative for cough, chest tightness and wheezing. Cardiovascular: Negative for chest pain and palpitations. Gastrointestinal: Negative for abdominal distention, abdominal pain and vomiting. Endocrine: Negative for cold intolerance. Genitourinary: Negative for difficulty urinating. Musculoskeletal: Negative for arthralgias. Skin: Negative for pallor and rash. Allergic/Immunologic: Negative for immunocompromised state. Neurological: Negative for dizziness and syncope. Hematological: Negative for adenopathy. Psychiatric/Behavioral: Negative for agitation, confusion and hallucinations. All other systems reviewed and are negative. Except as noted above the remainder of the review of systems was reviewed and negative. PAST MEDICAL HISTORY     Past Medical History:   Diagnosis Date    Kidney stone          SURGICALHISTORY     History reviewed. No pertinent surgical history. CURRENT MEDICATIONS       Previous Medications    APIXABAN (ELIQUIS) 5 MG TABS TABLET    Please take 10 mg po BID(twice daily) x 6 days, then 5 mg po BID( twice daily)  continuous       ALLERGIES     Patient has no known allergies. FAMILY HISTORY     History reviewed. No pertinent family history. SOCIAL HISTORY       Social History     Socioeconomic History    Marital status:      Spouse name: None    Number of children: None    Years of education: None    Highest education level: None   Occupational History    None   Tobacco Use    Smoking status: Never Smoker    Smokeless tobacco: Never Used   Substance and Sexual Activity    Alcohol use: Never    Drug use: Never    Sexual activity: Not Currently   Other Topics Concern    None   Social History Narrative    None     Social Determinants of Health     Financial Resource Strain:     Difficulty of Paying Living Expenses: Not on file   Food Insecurity:     Worried About Running Out of Food in the Last Year: Not on file    Allyssa of Food in the Last Year: Not on file   Transportation Needs:     Lack of Transportation (Medical): Not on file    Lack of Transportation (Non-Medical):  Not on file   Physical Activity:     Days of Exercise per Week: Not on file    Minutes of Exercise per Session: Not on file   Stress:     Feeling of Stress : Not on file   Social Connections:     Frequency of Communication with Friends and Family: Not on file    Frequency of Social Gatherings with Friends and Family: Not on file    Attends [employer] Services: Not on file    Active Member of Clubs or Organizations: Not on file    Attends Club or Organization Meetings: Not on file    Marital Status: Not on file   Intimate Partner Violence:     Fear of Current or Ex-Partner: Not on file    Emotionally Abused: Not on file    Physically Abused: Not on file    Sexually Abused: Not on file   Housing Stability:     Unable to Pay for Housing in the Last Year: Not on file    Number of [place] in the Last Year: Not on file    Unstable Housing in the Last Year: Not on file       SCREENINGS    [place] Coma Scale  Eye Opening: Spontaneous  Best Verbal Response: Oriented  Best Motor Response: Obeys commands  [place] Coma Scale Score: 15 @FLOW([id number])@      PHYSICAL EXAM    (up to 7 for level 4, 8 or more for level 5)     ED Triage Vitals [02/08/22 0956]   BP Temp Temp Source Pulse Resp SpO2 Height Weight   132/82 98.4 °F (36.9 °C) Oral 89 18 95 % 6' 2.5\" (1.892 m) 208 lb (94.3 kg)       Physical Exam  Vitals and nursing note reviewed. Constitutional:       Appearance: He is well-developed. HENT:      Head: Normocephalic. Nose: Nose normal.      Mouth/Throat:      Mouth: Mucous membranes are moist.   Eyes:      Conjunctiva/sclera: Conjunctivae normal.      Pupils: Pupils are equal, round, and reactive to light. Cardiovascular:      Rate and Rhythm: Normal rate and regular rhythm. Heart sounds: Normal heart sounds. Pulmonary:      Effort: Pulmonary effort is normal.      Breath sounds: Examination of the right-lower field reveals rales. Rales present. Abdominal:      General: Bowel sounds are normal.      Palpations: Abdomen is soft. Tenderness: There is no abdominal tenderness. There is no guarding.    Musculoskeletal: (1.892 m)          MDM patient's chest x-ray is consistent with out of a post COVID-19 pneumonia. He has no fever. Similar to past x-rays. He still has an oxygen demand but feels comfortable supplemental oxygen. At this point with otherwise normal vital signs particularly with supplemental oxygen he is discharged home. Family is at bedside and able and is comfortable with this treatment plan. CONSULTS:  None    PROCEDURES:  Unless otherwise noted below, none     Procedures    FINAL IMPRESSION      1. Hypoxia    2.  Dyspnea on exertion          DISPOSITION/PLAN   DISPOSITION Decision To Discharge 02/08/2022 01:33:22 PM      PATIENT REFERRED TO:  Jayy Hubbard MD  [address] [facility] [address]  [phone]    In 3 days        DISCHARGE MEDICATIONS:  New Prescriptions    No medications on file          (Please note that portions of this note were completed with a voice recognition program.  Efforts were made to edit the dictations but occasionally words are mis-transcribed.)    Lazarus Clam, MD (electronically signed)  Attending Emergency Physician          Lazarus Clam, MD  02/08/22 4791

## 2022-02-08 NOTE — ED NOTES
Patient given discharge instructions and prescription and verbalized understanding. Vital signs stable. Resp even and unlabored. Skin warm, dry and intact. Patient is alert and oriented. IV removed. Patient doesn't have any questions at this time. Patients daughter at bedside to transport him home. Patient set up with home oxygen by care coordinator.         José Mcdonald RN  02/08/22 1400

## 2022-08-05 NOTE — CARE COORDINATION
Attempted to call the patient in the room and the patient did not answer the phone. Patient was transferred to [facility] from [facility]. Patient is COVID+ and was found to be SOB and positive for a PE. Patient was on a heparin gtt and this was d/cd today, patient continued on Lovenox and plans are to switch the patient to a po antiocoag tomorrow. CM to follow for any d/c needs. Patient may need a discount card if discharged on an oral anticoagulation medication. Spontaneous, unlabored and symmetrical

## 2023-01-06 ENCOUNTER — HOSPITAL ENCOUNTER (EMERGENCY)
Age: 68
Discharge: HOME | End: 2023-01-06
Payer: MEDICARE

## 2023-01-06 ENCOUNTER — APPOINTMENT (OUTPATIENT)
Dept: GENERAL RADIOLOGY | Age: 68
End: 2023-01-06
Payer: MEDICARE

## 2023-01-06 VITALS
DIASTOLIC BLOOD PRESSURE: 88 MMHG | RESPIRATION RATE: 20 BRPM | OXYGEN SATURATION: 96 % | HEART RATE: 90 BPM | SYSTOLIC BLOOD PRESSURE: 121 MMHG | TEMPERATURE: 98.1 F

## 2023-01-06 DIAGNOSIS — S91.332A PUNCTURE WOUND OF LEFT FOOT, INITIAL ENCOUNTER: Primary | ICD-10-CM

## 2023-01-06 PROCEDURE — 6370000000 HC RX 637 (ALT 250 FOR IP)

## 2023-01-06 PROCEDURE — 73630 X-RAY EXAM OF FOOT: CPT

## 2023-01-06 PROCEDURE — 99284 EMERGENCY DEPT VISIT MOD MDM: CPT

## 2023-01-06 PROCEDURE — 6360000002 HC RX W HCPCS

## 2023-01-06 PROCEDURE — 90715 TDAP VACCINE 7 YRS/> IM: CPT

## 2023-01-06 PROCEDURE — 90471 IMMUNIZATION ADMIN: CPT

## 2023-01-06 RX ORDER — PRAVASTATIN SODIUM 40 MG
40 TABLET ORAL NIGHTLY
COMMUNITY
Start: 2022-06-09

## 2023-01-06 RX ORDER — CEPHALEXIN 500 MG/1
500 CAPSULE ORAL ONCE
Status: COMPLETED | OUTPATIENT
Start: 2023-01-06 | End: 2023-01-06

## 2023-01-06 RX ORDER — CEPHALEXIN 500 MG/1
500 CAPSULE ORAL 3 TIMES DAILY
Qty: 21 CAPSULE | Refills: 0 | Status: SHIPPED | OUTPATIENT
Start: 2023-01-06 | End: 2023-01-13

## 2023-01-06 RX ORDER — IBUPROFEN 600 MG/1
600 TABLET ORAL ONCE
Status: COMPLETED | OUTPATIENT
Start: 2023-01-06 | End: 2023-01-06

## 2023-01-06 RX ORDER — HYDROCODONE BITARTRATE AND ACETAMINOPHEN 5; 325 MG/1; MG/1
1 TABLET ORAL EVERY 8 HOURS PRN
Qty: 10 TABLET | Refills: 0 | Status: SHIPPED | OUTPATIENT
Start: 2023-01-06 | End: 2023-01-09

## 2023-01-06 RX ORDER — CIPROFLOXACIN 500 MG/1
500 TABLET, FILM COATED ORAL ONCE
Status: COMPLETED | OUTPATIENT
Start: 2023-01-06 | End: 2023-01-06

## 2023-01-06 RX ORDER — CIPROFLOXACIN 500 MG/1
500 TABLET, FILM COATED ORAL 2 TIMES DAILY
Qty: 14 TABLET | Refills: 0 | Status: SHIPPED | OUTPATIENT
Start: 2023-01-06 | End: 2023-01-13

## 2023-01-06 RX ORDER — GINSENG 100 MG
CAPSULE ORAL ONCE
Status: COMPLETED | OUTPATIENT
Start: 2023-01-06 | End: 2023-01-06

## 2023-01-06 RX ADMIN — BACITRACIN: 500 OINTMENT TOPICAL at 16:14

## 2023-01-06 RX ADMIN — CIPROFLOXACIN HYDROCHLORIDE 500 MG: 500 TABLET, FILM COATED ORAL at 16:14

## 2023-01-06 RX ADMIN — CEPHALEXIN 500 MG: 500 CAPSULE ORAL at 16:14

## 2023-01-06 RX ADMIN — TETANUS TOXOID, REDUCED DIPHTHERIA TOXOID AND ACELLULAR PERTUSSIS VACCINE, ADSORBED 0.5 ML: 5; 2.5; 8; 8; 2.5 SUSPENSION INTRAMUSCULAR at 16:14

## 2023-01-06 RX ADMIN — IBUPROFEN 600 MG: 600 TABLET, FILM COATED ORAL at 16:14

## 2023-01-06 ASSESSMENT — PAIN DESCRIPTION - DESCRIPTORS: DESCRIPTORS: DISCOMFORT

## 2023-01-06 ASSESSMENT — PAIN - FUNCTIONAL ASSESSMENT: PAIN_FUNCTIONAL_ASSESSMENT: 0-10

## 2023-01-06 ASSESSMENT — ENCOUNTER SYMPTOMS
COUGH: 0
SHORTNESS OF BREATH: 0
BACK PAIN: 0
NAUSEA: 0
DIARRHEA: 0
VOMITING: 0
ABDOMINAL PAIN: 0
SORE THROAT: 0

## 2023-01-06 ASSESSMENT — PAIN DESCRIPTION - LOCATION: LOCATION: FOOT

## 2023-01-06 ASSESSMENT — PAIN SCALES - GENERAL: PAINLEVEL_OUTOF10: 3

## 2023-01-06 ASSESSMENT — PAIN DESCRIPTION - ORIENTATION: ORIENTATION: LEFT

## 2023-01-06 NOTE — ED PROVIDER NOTES
[address] [place] [address] ED  eMERGENCYdEPARTMENT eNCOUnter      Pt Name: Karin Strickland  MRN: [id number]  [place] [birth year] evaluation: 1/6/2023  Provider:Galilea Longo [place], DANNIE - SHIREEN    CHIEF COMPLAINT       Chief Complaint   Patient presents with    Puncture Wound     Pt stepped on a jose alfredo nail at 0800 today, last tetanus unknown         HISTORY OF PRESENT ILLNESS  (Location/Symptom, Timing/Onset, Context/Setting, Quality, Duration, Modifying Factors, Severity.)   Karin Strickland is a 75 y.o. male hx of kidney stone who presents to the emergency department with puncture wound to left foot. Patient was checking [place] traps at 0800 today when he accidentally stepped on jose alfredo nail that penetrated through his rubber boot causing puncture wound to sole of foot below 4 th toe. He self removed the nail. Unsure of last tetanus vaccination. He complains of pain 3/10 left foot. Bleeding controlled. Denies any CP, SOB, fever, chills, nausea, emesis, abdominal pain, headache. HPI    Nursing Notes were reviewed and I agree. REVIEW OF SYSTEMS    (2-9 systems for level 4, 10 or more for level 5)     Review of Systems   Constitutional:  Negative for activity change, chills and fever. HENT:  Negative for ear pain and sore throat. Eyes:  Negative for visual disturbance. Respiratory:  Negative for cough and shortness of breath. Cardiovascular:  Negative for chest pain, palpitations and leg swelling. Gastrointestinal:  Negative for abdominal pain, diarrhea, nausea and vomiting. Genitourinary:  Negative for dysuria. Musculoskeletal:  Negative for back pain. Skin:  Positive for wound (puncture wound to sole of left foot). Negative for rash. Neurological:  Negative for dizziness and weakness. as noted above the remainder of the review of systems was reviewed and negative. PAST MEDICAL HISTORY     Past Medical History:   Diagnosis Date    Kidney stone          SURGICAL HISTORY     History reviewed.  No pertinent surgical history. CURRENT MEDICATIONS       Previous Medications    APIXABAN (ELIQUIS) 5 MG TABS TABLET    Please take 10 mg po BID(twice daily) x 6 days, then 5 mg po BID( twice daily)  continuous    PRAVASTATIN (PRAVACHOL) 40 MG TABLET    Take 40 mg by mouth nightly       ALLERGIES     Patient has no known allergies. HISTORY     History reviewed. No pertinent family history. SOCIAL HISTORY       Social History     Socioeconomic History    Marital status:      Spouse name: None    Number of children: None    Years of education: None    Highest education level: None   Tobacco Use    Smoking status: Never    Smokeless tobacco: Never   Substance and Sexual Activity    Alcohol use: Never    Drug use: Never    Sexual activity: Not Currently       SCREENINGS    Regine Coma Scale  Eye Opening: Spontaneous  Best Verbal Response: Oriented  Best Motor Response: Obeys commands  [place] Coma Scale Score: 15      PHYSICAL EXAM    (up to 7 forlevel 4, 8 or more for level 5)     ED Triage Vitals [01/06/23 1558]   BP Temp Temp Source Heart Rate Resp SpO2 Height Weight   -- 98.1 °F (36.7 °C) Oral (!) 104 20 96 % -- --       Physical Exam  Vitals and nursing note reviewed. Constitutional:       General: He is not in acute distress. Appearance: He is well-developed. He is not ill-appearing. HENT:      Head: Normocephalic and atraumatic. Right Ear: External ear normal.      Left Ear: External ear normal.      Nose: Nose normal.      Mouth/Throat:      Mouth: Mucous membranes are moist.   Eyes:      Conjunctiva/sclera: Conjunctivae normal.      Pupils: Pupils are equal, round, and reactive to light. Cardiovascular:      Rate and Rhythm: Normal rate and regular rhythm. Pulses: Normal pulses. Heart sounds: Normal heart sounds. No murmur heard. No friction rub. Pulmonary:      Effort: Pulmonary effort is normal.      Breath sounds: Normal breath sounds. No wheezing or rhonchi. Abdominal:      General: Bowel sounds are normal. There is no distension. Palpations: Abdomen is soft. Tenderness: There is no abdominal tenderness. Musculoskeletal:         General: Normal range of motion. Cervical back: Normal range of motion and neck supple. Feet:    Feet:      Comments: Puncture wound to sole of left foot just beneath left 4th toe  Skin:     General: Skin is warm and dry. Capillary Refill: Capillary refill takes less than 2 seconds. Neurological:      General: No focal deficit present. Mental Status: He is alert and oriented to person, place, and time. Mental status is at baseline. Psychiatric:         Mood and Affect: Mood normal.         Behavior: Behavior normal.         Thought Content: Thought content normal.         Judgment: Judgment normal.         DIAGNOSTIC RESULTS     RADIOLOGY:   Non-plain film images such as CT, Ultrasound and MRI are read by the radiologist. Plain radiographic images are visualized and preliminarilyinterpreted by DANNIE Crawford CNP with the below findings:        Interpretation per the Radiologist below, if available at the time of this note:    XR FOOT LEFT (MIN 3 VIEWS)    (Results Pending)       LABS:  Labs Reviewed - No data to display    All other labs were within normal range or not returnedas of this dictation. EMERGENCYDEPARTMENT COURSE and DIFFERENTIAL DIAGNOSIS/MDM:   Vitals:    Vitals:    01/06/23 1558   BP: 121/88   Pulse: (!) 104   Resp: 20   Temp: 98.1 °F (36.7 °C)   TempSrc: Oral   SpO2: 96%       REASSESSMENT            MDM  RD 75year old male presents to ED for puncture wound. Patient afebrile and hemodynamically stable. Boostrix IM updated. Medicated with Motrin po, Cipro po, Keflex po, topical bacitracin. Xray of left foot shows no acute process or foreign body. Wound care and dressing applied per RN with post op shoe. Pain improved post medication.  Suspect puncture wound of left foot causing pain.  Rx Norco, Cipro, and Keflex po given to antibiotic coverage as nail penetrated through rubber sole of shoe. Discussed Dx, Tx, Rx, follow up ,reasons to return to ED for further treatment patient states understanding and denies any questions. PROCEDURES:    Procedures      FINAL IMPRESSION      1. Puncture wound of left foot, initial encounter Stable         DISPOSITION/PLAN   DISPOSITION Discharge - Pending Orders Complete 01/06/2023 04:45:24 PM      PATIENT REFERRED TO:  Razia Oneil MD  [address] [facility] [address]  [phone]    In 2 days      [facility]  [address] [facility]  [phone]    If symptoms worsen    DISCHARGE MEDICATIONS:  New Prescriptions    CEPHALEXIN (KEFLEX) 500 MG CAPSULE    Take 1 capsule by mouth 3 times daily for 7 days    CIPROFLOXACIN (CIPRO) 500 MG TABLET    Take 1 tablet by mouth 2 times daily for 7 days    HYDROCODONE-ACETAMINOPHEN (NORCO) 5-325 MG PER TABLET    Take 1 tablet by mouth every 8 hours as needed for Pain for up to 3 days. Intended supply: 3 days.  Take lowest dose possible to manage pain Max Daily Amount: 3 tablets       (Please note that portions of this note were completed with a voice recognition program.  Efforts were made to edit the dictations but occasionally words are mis-transcribed.)    DANNIE Gil CNP, APRN - CNP  01/06/23 1617